# Patient Record
Sex: FEMALE | Race: WHITE | NOT HISPANIC OR LATINO | Employment: OTHER
[De-identification: names, ages, dates, MRNs, and addresses within clinical notes are randomized per-mention and may not be internally consistent; named-entity substitution may affect disease eponyms.]

---

## 2017-08-19 ENCOUNTER — HEALTH MAINTENANCE LETTER (OUTPATIENT)
Age: 52
End: 2017-08-19

## 2017-08-25 ENCOUNTER — TELEPHONE (OUTPATIENT)
Dept: OBGYN | Facility: CLINIC | Age: 52
End: 2017-08-25

## 2017-08-25 NOTE — LETTER
August 25, 2017      Britt Elizabethmichele  [address] [address] [employer] [facility] [address]    Dear ,      This letter is to remind you that you are due for your follow up PAP smear .    Please call [phone] to schedule your appointment at your earliest convenience.     If you have completed the tests outside of [place], please have the results forwarded to our office. We will update the chart for your primary Physician to review before your next annual physical.     Sincerely,      Rosa Powell MD

## 2017-08-25 NOTE — TELEPHONE ENCOUNTER
Panel Management Review          Composite cancer screening  Chart review shows that this patient is due/due soon for the following Pap Smear  Summary:    Patient is due/failing the following:   PAP    Action needed:   Patient needs office visit for pap.    Type of outreach:    Sent letter.    Questions for provider review:    None                                                                                                                                    Luanne Rosario

## 2017-12-06 ENCOUNTER — OFFICE VISIT (OUTPATIENT)
Dept: FAMILY MEDICINE | Facility: CLINIC | Age: 52
End: 2017-12-06
Payer: COMMERCIAL

## 2017-12-06 VITALS
BODY MASS INDEX: 30.22 KG/M2 | HEART RATE: 75 BPM | DIASTOLIC BLOOD PRESSURE: 110 MMHG | SYSTOLIC BLOOD PRESSURE: 185 MMHG | HEIGHT: 64 IN | WEIGHT: 177 LBS | TEMPERATURE: 96.5 F

## 2017-12-06 DIAGNOSIS — I10 SEVERE HYPERTENSION: Primary | ICD-10-CM

## 2017-12-06 DIAGNOSIS — I10 BENIGN ESSENTIAL HYPERTENSION: ICD-10-CM

## 2017-12-06 PROCEDURE — 99214 OFFICE O/P EST MOD 30 MIN: CPT | Performed by: FAMILY MEDICINE

## 2017-12-06 RX ORDER — AMLODIPINE BESYLATE 5 MG/1
5 TABLET ORAL DAILY
Qty: 90 TABLET | Refills: 3 | Status: SHIPPED | OUTPATIENT
Start: 2017-12-06 | End: 2017-12-14

## 2017-12-06 RX ORDER — METOPROLOL TARTRATE 25 MG/1
25 TABLET, FILM COATED ORAL 2 TIMES DAILY
Qty: 180 TABLET | Refills: 3 | Status: SHIPPED | OUTPATIENT
Start: 2017-12-06 | End: 2018-03-19

## 2017-12-06 RX ORDER — AMLODIPINE BESYLATE 2.5 MG/1
2.5 TABLET ORAL DAILY
Qty: 90 TABLET | Refills: 3 | Status: CANCELLED | OUTPATIENT
Start: 2017-12-06

## 2017-12-06 NOTE — NURSING NOTE
"Chief Complaint   Patient presents with     Hypertension     Refill Request       Initial BP (!) 190/115 (BP Location: Right arm, Cuff Size: Adult Regular)  Pulse 75  Temp 96.5  F (35.8  C) (Tympanic)  Ht 5' 4\" (1.626 m)  Wt 177 lb (80.3 kg)  BMI 30.38 kg/m2 Estimated body mass index is 30.38 kg/(m^2) as calculated from the following:    Height as of this encounter: 5' 4\" (1.626 m).    Weight as of this encounter: 177 lb (80.3 kg).  Medication Reconciliation: complete  "

## 2017-12-06 NOTE — PATIENT INSTRUCTIONS
Thank you for choosing [facility].  You may be receiving a survey in the mail from [facility] Romero regarding your visit today.  Please take a few minutes to complete and return the survey to let us know how we are doing.      If you have questions or concerns, please contact us via DOZ or you can contact your care team at [phone].    Our Clinic hours are:  Monday 6:40 am  to 7:00 pm  Tuesday -Friday 6:40 am to 5:00 pm    The [place] outpatient lab hours are:  Monday - Friday 6:10 am to 4:45 pm  Saturdays 7:00 am to 11:00 am  Appointments are required, call [phone]    If you have clinical questions after hours or would like to schedule an appointment,  call the clinic at [phone].  Uncontrolled High Blood Pressure (Established)    Your blood pressure was unusually high today. This can occur if you ve missed doses of your blood pressure medicine. Or it can happen if you are taking other medicines. These include some asthma inhalers, decongestants, diet pills, and street drugs like cocaine and amphetamine.  Other causes include:    Weight gain    More salt in your diet    Smoking    Caffeine  Your blood pressure can also rise if you are emotionally upset or in intense pain. It may go back to normal after a period of rest.  A blood pressure reading is made up of 2 numbers. There is a top number over a bottom number. The top number is the systolic pressure. The bottom number is the diastolic pressure. A normal blood pressure is a systolic pressure of less than 120 over a diastolic pressure of less than 80. High blood pressure (hypertension) is when the top number is 140 or higher. Or it is when the bottom number is 90 or higher. You will see your blood pressure readings written together. For example, a person with a systolic pressure of 118 and a diastolic pressure of 78 will have 118/78 written in the medical record. To be high blood pressure, the numbers must be higher when tested over a  period of time. The blood pressures between normal and hypertension are called prehypertension. Prehypertension is a warning sign. The information gives you a chance to make lifestyle changes (weight loss, more exercise) that can keep your blood pressure from going higher.  Home care  It s important to take steps to lower your blood pressure. If you are taking blood pressure medicine, the guidelines below may help you need less or no medicines in the future.    Begin a weight-loss program if you are overweight.    Cut back on the amount of salt in your diet:    Avoid high-salt foods like olives, pickles, smoked meats, and salted potato chips.    Don t add salt to your food at the table.    Use only small amounts of salt when cooking.    Begin an exercise program. Talk with your health care provider about what exercise program is best for you. It doesn t have to be difficult. Even brisk walking for 20 minutes 3 times a week is a good form of exercise.    Avoid medicines that stimulates the heart. This includes many over-the-counter cold and sinus decongestant pills and sprays, as well as diet pills. Check the warnings about hypertension on the label. Before purchasing any over-the-counter medicines or supplements, always ask the pharmacist about the product's potential interaction with your high blood pressure and your medicines.    Stimulants such as amphetamine or cocaine could be lethal for someone with hypertension. Never take these.    Limit how much caffeine you drink. Or switch to noncaffeinated beverages.    Stop smoking. If you are a long-time smoker, this can be hard. Enroll in a stop-smoking program to make it more likely that you will succeed. Talk with your provider about ways to quit.    Learn how to handle stress better. This is an important part of any program to lower blood pressure. Learn ways to relax. These include meditation, yoga, and biofeedback.    If medicines were prescribed, take them  exactly as directed. Missing doses may cause your blood pressure to get out of control.    If you miss a dose or doses of your medicines, check with your healthcare provider or pharmacist about what to do.    Consider buying an automatic blood pressure machine. Your provider may recommend a certain type. You can get one of these at most pharmacies. Measure your blood pressure twice a day, in the morning, and in the late afternoon. Keep a written record of your home blood pressure readings and take the record to your medical appointments.  Here are some additional guidelines on home blood pressure monitoring from the American Heart Association.    Don't smoke or drink coffee for 30 minutes    Go to the bathroom before the test.    Relax for 5 minutes before taking the measurement.    Sit correctly. Be sure your back is supported. Don't sit on a couch or soft chair. Uncross your feet and place them flat on the floor. Place your arm on a solid, flat surface like a table with the upper arm at heart level. Make certain the middle of the cuff is directly above the eye of the elbow. Check the monitor's instruction manual for an illustration.    Take multiple readings. When you measure, take 2 or 3 readings one minute apart and record all of the results.    Take your blood pressure at the same time every day, or as your healthcare provider recommends.    Record the date, time, and blood pressure reading.    Take the record with you to your next appointment. If your blood pressure monitor has a built-in memory, simply take the monitor with you to your next appointment.    Call your provider if you have several high readings. Don't be frightened by a single high reading, but if you get several high readings, check in with your healthcare provider.    Note: When blood pressure reaches a systolic (top number) of 180 or higher or a diastolic (bottom number) of 110 or higher, emergency medical treatment is required. Call your  healthcare provider immediately.  Follow-up care  Regular visits to your own healthcare provider for blood pressure and medicine checks are an important part of your care. Make a follow-up appointment as directed. Bring the record of your home blood pressure readings to the appointment.  When to seek medical advice  Call your healthcare provider right away if any of these occur:    Blood pressure reaches a systolic (top number) of 180 or higher or diastolic (bottom number) of 110 or higher, emergency medical treatment is required.    Chest, arm, shoulder, neck, or upper back pain    Shortness of breath    Severe headache    Throbbing or rushing sound in the ears    Nosebleed    Extreme drowsiness, confusion, or fainting    Dizziness or dizziness with spinning sensation (vertigo)    Weakness in an arm or leg or on one side of the face    Trouble speaking or seeing   Date Last Reviewed: 1/1/2017 2000-2017 [employer]. [address] [place] [id number]. All rights reserved. This information is not intended as a substitute for professional medical care. Always follow your healthcare professional's instructions.

## 2017-12-06 NOTE — PROGRESS NOTES
SUBJECTIVE:   Britt German is a 52 year old female who presents to clinic today for the following health issues:      Hypertension Follow-up      Outpatient blood pressures are being checked at home.  Results are 160/90s.witout medication     Low Salt Diet: low salt        Amount of exercise or physical activity: none at this time     Problems taking medications regularly: No    Medication side effects: none    Diet: low salt    Medication Followup of amlodipine     Taking Medication as prescribed: yes    Side Effects:  None    Medication Helping Symptoms:  yes           Patient with long standing hypertension, here for BP medication refills. She has been out of her medication for weeks. Has had some symptoms of severe headaches and palpitations.     Problem list and histories reviewed & adjusted, as indicated.  Additional history: as documented    Patient Active Problem List   Diagnosis     Family history of breast cancer in mother     Tubal ligation status     Hyperlipidemia with target LDL less than 160     Recurrent UTI     Hypertension goal BP (blood pressure) < 140/90     Indeterminate pulmonary nodules     LULÚ (obstructive sleep apnea)     Past Surgical History:   Procedure Laterality Date     ABDOMINOPLASTY      [facility]     ARTHROSCOPY ANKLE Left 2016    Procedure: ARTHROSCOPY ANKLE;  Surgeon: Zac Cuello DPM;  Location: [facility] OR     C/SECTION, LOW TRANSVERSE  ,     , Low Transverse     [facility] AFF SURGICAL PATHOLOGY  ,     Breast reduction     D & C       DILATION AND CURETTAGE, HYSTEROSCOPY, ABLATE ENDOMETRIUM NOVASURE, COMBINED  2014    Procedure: COMBINED DILATION AND CURETTAGE, HYSTEROSCOPY, ABLATE ENDOMETRIUM NOVASURE;  Surgeon: Rachel Hernandez DO;  Location: [facility] OR     INCISION AND DRAINAGE LOWER EXTREMITY, COMBINED  2011    COMBINED INCISION AND DRAINAGE LOWER EXTREMITY performed by LEY, JEFFREY DUANE at [facility] OR     OPEN REDUCTION INTERNAL  FIXATION ANKLE Left 12/23/2015    Procedure: OPEN REDUCTION INTERNAL FIXATION ANKLE;  Surgeon: Zac Cuello DPM;  Location: [facility] OR     REMOVE HARDWARE ANKLE  2/22/2011    REMOVE HARDWARE ANKLE performed by LEY, JEFFREY DUANE at [facility] OR     REMOVE HARDWARE ANKLE Left 9/1/2016    Procedure: REMOVE HARDWARE ANKLE;  Surgeon: Zac Cuello DPM;  Location: [facility] OR     SURGICAL HISTORY OF -   2006    Right ankle fracture, ORIF     TUBAL LIGATION  1994       Social History   Substance Use Topics     Smoking status: Never Smoker     Smokeless tobacco: Never Used     Alcohol use Yes      Comment: 2-3 times weekly     Family History   Problem Relation Age of Onset     Breast Cancer Mother      dx age 38     DIABETES Sister      Cardiovascular Father      CHF     Respiratory Father      COPD     Cardiovascular Son      aortic stenosis         Current Outpatient Prescriptions   Medication Sig Dispense Refill     amLODIPine (NORVASC) 5 MG tablet Take 1 tablet (5 mg) by mouth daily 90 tablet 3     metoprolol (LOPRESSOR) 25 MG tablet Take 1 tablet (25 mg) by mouth 2 times daily 180 tablet 3     amLODIPine (NORVASC) 2.5 MG tablet Take 1 tablet (2.5 mg) by mouth daily 90 tablet 1     Blood Pressure Monitoring (B-D ASSURE BPM/AUTO ARM CUFF) MISC 1 Device 3 times daily. 1 each 0     Allergies   Allergen Reactions     Hydrochlorothiazide Rash     Lisinopril      Hives     BP Readings from Last 3 Encounters:   12/06/17 (!) 185/110   10/06/16 150/90   02/04/16 138/85    Wt Readings from Last 3 Encounters:   12/06/17 177 lb (80.3 kg)   10/06/16 170 lb 3.2 oz (77.2 kg)   02/04/16 173 lb (78.5 kg)                  Labs reviewed in EPIC        Reviewed and updated as needed this visit by clinical staffTobacco  Allergies  Med Hx  Surg Hx  Fam Hx  Soc Hx      Reviewed and updated as needed this visit by Provider         ROS:  Constitutional, HEENT, cardiovascular, pulmonary, gi and gu systems are negative, except as otherwise  "noted.      OBJECTIVE:   BP (!) 185/110  Pulse 75  Temp 96.5  F (35.8  C) (Tympanic)  Ht 5' 4\" (1.626 m)  Wt 177 lb (80.3 kg)  BMI 30.38 kg/m2  Body mass index is 30.38 kg/(m^2).  GENERAL: healthy, alert and no distress  EYES: Eyes grossly normal to inspection, PERRL and conjunctivae and sclerae normal  HENT: ear canals and TM's normal, nose and mouth without ulcers or lesions  NECK: no adenopathy, no asymmetry, masses, or scars and thyroid normal to palpation  RESP: lungs clear to auscultation - no rales, rhonchi or wheezes  CV: regular rate and rhythm, normal S1 S2, no S3 or S4, no murmur, click or rub, no peripheral edema and peripheral pulses strong  MS: no gross musculoskeletal defects noted, no edema  SKIN: no suspicious lesions or rashes    Diagnostic Test Results:  Results for orders placed or performed in visit on 10/06/16   Follicle stimulating hormone   Result Value Ref Range    FSH 6.2 IU/L       ASSESSMENT/PLAN:   1. Benign essential hypertension  Largely uncontrolled. Added amlodipine to metoprolol. Patient reacted to hydrochlorothiazide when she used it.   - amLODIPine (NORVASC) 5 MG tablet; Take 1 tablet (5 mg) by mouth daily  Dispense: 90 tablet; Refill: 3  - metoprolol (LOPRESSOR) 25 MG tablet; Take 1 tablet (25 mg) by mouth 2 times daily  Dispense: 180 tablet; Refill: 3  - Lipid panel reflex to direct LDL Fasting; Future  - **Basic metabolic panel FUTURE anytime; Future    2. Severe hypertension  Needs to come back for a recheck in a couple of weeks.,  - amLODIPine (NORVASC) 5 MG tablet; Take 1 tablet (5 mg) by mouth daily  Dispense: 90 tablet; Refill: 3  - metoprolol (LOPRESSOR) 25 MG tablet; Take 1 tablet (25 mg) by mouth 2 times daily  Dispense: 180 tablet; Refill: 3  - Lipid panel reflex to direct LDL Fasting; Future    FUTURE APPOINTMENTS:       - Follow-up visit as needed    Tyesha Silva MD  [facility]  "

## 2017-12-06 NOTE — MR AVS SNAPSHOT
After Visit Summary   12/6/2017    Britt German    MRN: [id number]           Patient Information     Date Of Birth          1965        Visit Information        Provider Department      12/6/2017 7:20 AM Tyesha Silva MD [facility]        Today's Diagnoses     Benign essential hypertension          Care Instructions          Thank you for choosing [facility].  You may be receiving a survey in the mail from [facility] regarding your visit today.  Please take a few minutes to complete and return the survey to let us know how we are doing.      If you have questions or concerns, please contact us via [email or web address] or you can contact your care team at [phone].    Our Clinic hours are:  Monday 6:40 am  to 7:00 pm  Tuesday -Friday 6:40 am to 5:00 pm    The [place] outpatient lab hours are:  Monday - Friday 6:10 am to 4:45 pm  Saturdays 7:00 am to 11:00 am  Appointments are required, call [phone]    If you have clinical questions after hours or would like to schedule an appointment,  call the clinic at [phone].  Uncontrolled High Blood Pressure (Established)    Your blood pressure was unusually high today. This can occur if you ve missed doses of your blood pressure medicine. Or it can happen if you are taking other medicines. These include some asthma inhalers, decongestants, diet pills, and street drugs like cocaine and amphetamine.  Other causes include:    Weight gain    More salt in your diet    Smoking    Caffeine  Your blood pressure can also rise if you are emotionally upset or in intense pain. It may go back to normal after a period of rest.  A blood pressure reading is made up of 2 numbers. There is a top number over a bottom number. The top number is the systolic pressure. The bottom number is the diastolic pressure. A normal blood pressure is a systolic pressure of less than 120 over a diastolic pressure of less than 80. High blood pressure  (hypertension) is when the top number is 140 or higher. Or it is when the bottom number is 90 or higher. You will see your blood pressure readings written together. For example, a person with a systolic pressure of 118 and a diastolic pressure of 78 will have 118/78 written in the medical record. To be high blood pressure, the numbers must be higher when tested over a period of time. The blood pressures between normal and hypertension are called prehypertension. Prehypertension is a warning sign. The information gives you a chance to make lifestyle changes (weight loss, more exercise) that can keep your blood pressure from going higher.  Home care  It s important to take steps to lower your blood pressure. If you are taking blood pressure medicine, the guidelines below may help you need less or no medicines in the future.    Begin a weight-loss program if you are overweight.    Cut back on the amount of salt in your diet:    Avoid high-salt foods like olives, pickles, smoked meats, and salted potato chips.    Don t add salt to your food at the table.    Use only small amounts of salt when cooking.    Begin an exercise program. Talk with your health care provider about what exercise program is best for you. It doesn t have to be difficult. Even brisk walking for 20 minutes 3 times a week is a good form of exercise.    Avoid medicines that stimulates the heart. This includes many over-the-counter cold and sinus decongestant pills and sprays, as well as diet pills. Check the warnings about hypertension on the label. Before purchasing any over-the-counter medicines or supplements, always ask the pharmacist about the product's potential interaction with your high blood pressure and your medicines.    Stimulants such as amphetamine or cocaine could be lethal for someone with hypertension. Never take these.    Limit how much caffeine you drink. Or switch to noncaffeinated beverages.    Stop smoking. If you are a long-time  smoker, this can be hard. Enroll in a stop-smoking program to make it more likely that you will succeed. Talk with your provider about ways to quit.    Learn how to handle stress better. This is an important part of any program to lower blood pressure. Learn ways to relax. These include meditation, yoga, and biofeedback.    If medicines were prescribed, take them exactly as directed. Missing doses may cause your blood pressure to get out of control.    If you miss a dose or doses of your medicines, check with your healthcare provider or pharmacist about what to do.    Consider buying an automatic blood pressure machine. Your provider may recommend a certain type. You can get one of these at most pharmacies. Measure your blood pressure twice a day, in the morning, and in the late afternoon. Keep a written record of your home blood pressure readings and take the record to your medical appointments.  Here are some additional guidelines on home blood pressure monitoring from the American Heart Association.    Don't smoke or drink coffee for 30 minutes    Go to the bathroom before the test.    Relax for 5 minutes before taking the measurement.    Sit correctly. Be sure your back is supported. Don't sit on a couch or soft chair. Uncross your feet and place them flat on the floor. Place your arm on a solid, flat surface like a table with the upper arm at heart level. Make certain the middle of the cuff is directly above the eye of the elbow. Check the monitor's instruction manual for an illustration.    Take multiple readings. When you measure, take 2 or 3 readings one minute apart and record all of the results.    Take your blood pressure at the same time every day, or as your healthcare provider recommends.    Record the date, time, and blood pressure reading.    Take the record with you to your next appointment. If your blood pressure monitor has a built-in memory, simply take the monitor with you to your next  appointment.    Call your provider if you have several high readings. Don't be frightened by a single high reading, but if you get several high readings, check in with your healthcare provider.    Note: When blood pressure reaches a systolic (top number) of 180 or higher or a diastolic (bottom number) of 110 or higher, emergency medical treatment is required. Call your healthcare provider immediately.  Follow-up care  Regular visits to your own healthcare provider for blood pressure and medicine checks are an important part of your care. Make a follow-up appointment as directed. Bring the record of your home blood pressure readings to the appointment.  When to seek medical advice  Call your healthcare provider right away if any of these occur:    Blood pressure reaches a systolic (top number) of 180 or higher or diastolic (bottom number) of 110 or higher, emergency medical treatment is required.    Chest, arm, shoulder, neck, or upper back pain    Shortness of breath    Severe headache    Throbbing or rushing sound in the ears    Nosebleed    Extreme drowsiness, confusion, or fainting    Dizziness or dizziness with spinning sensation (vertigo)    Weakness in an arm or leg or on one side of the face    Trouble speaking or seeing   Date Last Reviewed: 1/1/2017 2000-2017 [employer]. [address] [place] [address]. All rights reserved. This information is not intended as a substitute for professional medical care. Always follow your healthcare professional's instructions.                Follow-ups after your visit        Your next 10 appointments already scheduled     Dec 14, 2017  9:15 AM CST   PHYS[facility] with Millicent Corona MD   [facility]    [address] [facility] [address]   [phone]            Dec 29, 2017  7:45 AM CST   (Arrive by 7:30 AM)   MA SCREENING DIGITAL BILATERAL with [address] ([place]  "[facility])    [address] [facility] [address] [phone]           Do not use any powder, lotion or deodorant under your arms or on your breast. If you do, we will ask you to remove it before your exam.  Wear comfortable, two-piece clothing.  If you have any allergies, tell your care team.  Bring any previous mammograms from other facilities or have them mailed to the breast center. Three-dimensional (3D) mammograms are available at [place] locations in [facility], [facility], [facility], and [place]. [facility] locations include [place] and [facility] in [place]. Benefits of 3D mammograms include: - Improved rate of cancer detection - Decreases your chance of having to go back for more tests, which means fewer: - \"False-positive\" results (This means that there is an abnormal area but it isn't cancer.) - Invasive testing procedures, such as a biopsy or surgery - Can provide clearer images of the breast if you have dense breast tissue. 3D mammography is an optional exam that anyone can have with a 2D mammogram. It doesn't replace or take the place of a 2D mammogram. 2D mammograms remain an effective screening test for all women.  Not all insurance companies cover the cost of a 3D mammogram. Check with your insurance.              Future tests that were ordered for you today     Open Future Orders        Priority Expected Expires Ordered    Lipid panel reflex to direct LDL Fasting Routine 12/6/2017 12/6/2018 12/6/2017    **Basic metabolic panel FUTURE anytime Routine 12/6/2017 12/6/2018 12/6/2017    MA Screening Digital Bilateral Routine  12/5/2018 12/5/2017            Who to contact     If you have questions or need follow up information about today's clinic visit or your schedule please contact [facility] directly at [phone].  Normal or non-critical lab and imaging results will be communicated to you by [facility], " "letter or phone within 4 business days after the clinic has received the results. If you do not hear from us within 7 days, please contact the clinic through [employer] or phone. If you have a critical or abnormal lab result, we will notify you by phone as soon as possible.  Submit refill requests through [employer] or call your pharmacy and they will forward the refill request to us. Please allow 3 business days for your refill to be completed.          Additional Information About Your Visit        [employer] Information     [employer] gives you secure access to your electronic health record. If you see a primary care provider, you can also send messages to your care team and make appointments. If you have questions, please call your primary care clinic.  If you do not have a primary care provider, please call [phone] and they will assist you.        Care EveryWhere ID     This is your Care EveryWhere ID. This could be used by other organizations to access your [place] medical records  [id number]        Your Vitals Were     Pulse Temperature Height BMI (Body Mass Index)          75 96.5  F (35.8  C) (Tympanic) 5' 4\" (1.626 m) 30.38 kg/m2         Blood Pressure from Last 3 Encounters:   12/06/17 (!) 190/115   10/06/16 150/90   02/04/16 138/85    Weight from Last 3 Encounters:   12/06/17 177 lb (80.3 kg)   10/06/16 170 lb 3.2 oz (77.2 kg)   02/04/16 173 lb (78.5 kg)                 Today's Medication Changes          These changes are accurate as of: 12/6/17  8:15 AM.  If you have any questions, ask your nurse or doctor.               Start taking these medicines.        Dose/Directions    metoprolol 25 MG tablet   Commonly known as:  LOPRESSOR   Used for:  Benign essential hypertension   Started by:  Tyesha Silva MD        Dose:  25 mg   Take 1 tablet (25 mg) by mouth 2 times daily   Quantity:  180 tablet   Refills:  3         These medicines have changed or have updated prescriptions.        " Dose/Directions    * amLODIPine 2.5 MG tablet   Commonly known as:  NORVASC   This may have changed:  Another medication with the same name was added. Make sure you understand how and when to take each.   Used for:  Benign essential hypertension   Changed by:  Tyesha Silva MD        Dose:  2.5 mg   Take 1 tablet (2.5 mg) by mouth daily   Quantity:  90 tablet   Refills:  1       * amLODIPine 5 MG tablet   Commonly known as:  NORVASC   This may have changed:  You were already taking a medication with the same name, and this prescription was added. Make sure you understand how and when to take each.   Used for:  Benign essential hypertension   Changed by:  Tyesha Silva MD        Dose:  5 mg   Take 1 tablet (5 mg) by mouth daily   Quantity:  90 tablet   Refills:  3       * Notice:  This list has 2 medication(s) that are the same as other medications prescribed for you. Read the directions carefully, and ask your doctor or other care provider to review them with you.         Where to get your medicines      These medications were sent to [facility] [address]  [address] [facility] [address]     Phone:  [phone]     amLODIPine 5 MG tablet    metoprolol 25 MG tablet                Primary Care Provider Office Phone # Fax #    Shawna Preston [phone]       [facility] CARE [facility] [address] [place] [address]        Equal Access to Services     BRIAN CASTILLO AH: Hadii arian solares hadasho Sobreanna, waaxda luqadaha, qaybta kaalmada adeegyada, zion rockwell hayshira arenas . So [facility] [phone].    ATENCIÓN: Si habla español, tiene a trimble disposición servicios gratuitos de asistencia lingüística. Llame al [phone].    We comply with applicable federal civil rights laws and [place] laws. We do not discriminate on the basis of race, color, national origin, age, disability, sex, sexual orientation, or gender  identity.            Thank you!     Thank you for choosing [facility]  for your care. Our goal is always to provide you with excellent care. Hearing back from our patients is one way we can continue to improve our services. Please take a few minutes to complete the written survey that you may receive in the mail after your visit with us. Thank you!             Your Updated Medication List - Protect others around you: Learn how to safely use, store and throw away your medicines at www.disposemymeds.org.          This list is accurate as of: 12/6/17  8:15 AM.  Always use your most recent med list.                   Brand Name Dispense Instructions for use Diagnosis    * amLODIPine 2.5 MG tablet    NORVASC    90 tablet    Take 1 tablet (2.5 mg) by mouth daily    Benign essential hypertension       * amLODIPine 5 MG tablet    NORVASC    90 tablet    Take 1 tablet (5 mg) by mouth daily    Benign essential hypertension       B-D ASSURE BPM/AUTO ARM CUFF Misc     1 each    1 Device 3 times daily.    Elevated blood pressure reading without diagnosis of hypertension       metoprolol 25 MG tablet    LOPRESSOR    180 tablet    Take 1 tablet (25 mg) by mouth 2 times daily    Benign essential hypertension       * Notice:  This list has 2 medication(s) that are the same as other medications prescribed for you. Read the directions carefully, and ask your doctor or other care provider to review them with you.

## 2017-12-14 ENCOUNTER — OFFICE VISIT (OUTPATIENT)
Dept: OBGYN | Facility: CLINIC | Age: 52
End: 2017-12-14
Payer: COMMERCIAL

## 2017-12-14 VITALS
WEIGHT: 183 LBS | SYSTOLIC BLOOD PRESSURE: 131 MMHG | BODY MASS INDEX: 31.24 KG/M2 | DIASTOLIC BLOOD PRESSURE: 84 MMHG | HEIGHT: 64 IN | HEART RATE: 76 BPM

## 2017-12-14 DIAGNOSIS — Z01.419 WELL FEMALE EXAM WITH ROUTINE GYNECOLOGICAL EXAM: Primary | ICD-10-CM

## 2017-12-14 PROCEDURE — G0145 SCR C/V CYTO,THINLAYER,RESCR: HCPCS | Performed by: OBSTETRICS & GYNECOLOGY

## 2017-12-14 PROCEDURE — 99396 PREV VISIT EST AGE 40-64: CPT | Performed by: OBSTETRICS & GYNECOLOGY

## 2017-12-14 PROCEDURE — 87624 HPV HI-RISK TYP POOLED RSLT: CPT | Performed by: OBSTETRICS & GYNECOLOGY

## 2017-12-14 NOTE — NURSING NOTE
"Initial /84 (BP Location: Left arm, Patient Position: Chair, Cuff Size: Adult Regular)  Pulse 76  Ht 5' 4\" (1.626 m)  Wt 183 lb (83 kg)  Breastfeeding? No  BMI 31.41 kg/m2 Estimated body mass index is 31.41 kg/(m^2) as calculated from the following:    Height as of this encounter: 5' 4\" (1.626 m).    Weight as of this encounter: 183 lb (83 kg). .    Luanne Rosario      "

## 2017-12-14 NOTE — PROGRESS NOTES
SUBJECTIVE:   CC: Britt German is an 52 year old P2 (C-sec x 2)woman who presents for preventive health visit.     Healthy Habits:    Do you get at least three servings of calcium containing foods daily (dairy, green leafy vegetables, etc.)? yes    Amount of exercise or daily activities, outside of work: 2 day(s) per week    Problems taking medications regularly No    Medication side effects: No    Have you had an eye exam in the past two years? yes    Do you see a dentist twice per year? yes    Do you have sleep apnea, excessive snoring or daytime drowsiness?no      No concerns at this time.   Denies vaginal bleeding since endometrial ablation in 2014.    Today's PHQ-2 Score: PHQ-2 ( 1999 Pfizer) 12/14/2017 10/6/2016   Q1: Little interest or pleasure in doing things 0 0   Q2: Feeling down, depressed or hopeless 0 0   PHQ-2 Score 0 0         Abuse: Current or Past(Physical, Sexual or Emotional)- No  Do you feel safe in your environment - Yes  Social History   Substance Use Topics     Smoking status: Never Smoker     Smokeless tobacco: Never Used     Alcohol use Yes      Comment: 2-3 times weekly     If you drink alcohol do you typically have >3 drinks per day or >7 drinks per week? No                     Reviewed orders with patient.  Reviewed health maintenance and updated orders accordingly - Yes  Current Outpatient Prescriptions   Medication Sig Dispense Refill     metoprolol (LOPRESSOR) 25 MG tablet Take 1 tablet (25 mg) by mouth 2 times daily 180 tablet 3     amLODIPine (NORVASC) 2.5 MG tablet Take 1 tablet (2.5 mg) by mouth daily 90 tablet 1     Blood Pressure Monitoring (B-D ASSURE BPM/AUTO ARM CUFF) MISC 1 Device 3 times daily. 1 each 0     [DISCONTINUED] amLODIPine (NORVASC) 5 MG tablet Take 1 tablet (5 mg) by mouth daily 90 tablet 3     Allergies   Allergen Reactions     Hydrochlorothiazide Rash     Lisinopril      Hives       Patient over age 50, mutual decision to screen reflected in health  maintenance.      Pertinent mammograms are reviewed under the imaging tab.  History of abnormal Pap smear: NO - age 30-65 PAP every 5 years with negative HPV co-testing recommended    Reviewed and updated as needed this visit by clinical staffTobacco  Allergies  Meds  Med Hx  Surg Hx  Fam Hx  Soc Hx        Reviewed and updated as needed this visit by Provider        Past Medical History:   Diagnosis Date     Recurrent UTI       Past Surgical History:   Procedure Laterality Date     ABDOMINOPLASTY      [facility]     ARTHROSCOPY ANKLE Left 2016    Procedure: ARTHROSCOPY ANKLE;  Surgeon: Zac Cuello DPM;  Location: [facility] OR     C/SECTION, LOW TRANSVERSE  ,     , Low Transverse     [facility]FF SURGICAL PATHOLOGY  ,     Breast reduction     D & C       DILATION AND CURETTAGE, HYSTEROSCOPY, ABLATE ENDOMETRIUM NOVASURE, COMBINED  2014    Procedure: COMBINED DILATION AND CURETTAGE, HYSTEROSCOPY, ABLATE ENDOMETRIUM NOVASURE;  Surgeon: Rachel Hernandez DO;  Location: [facility]     INCISION AND DRAINAGE LOWER EXTREMITY, COMBINED  2011    COMBINED INCISION AND DRAINAGE LOWER EXTREMITY performed by LEY, JEFFREY DUANE at [facility]     OPEN REDUCTION INTERNAL FIXATION ANKLE Left 2015    Procedure: OPEN REDUCTION INTERNAL FIXATION ANKLE;  Surgeon: Zac Cuello DPM;  Location: [facility] OR     REMOVE HARDWARE ANKLE  2011    REMOVE HARDWARE ANKLE performed by LEY, JEFFREY DUANE at [facility]     REMOVE HARDWARE ANKLE Left 2016    Procedure: REMOVE HARDWARE ANKLE;  Surgeon: Zac Cuello DPM;  Location: [facility] OR     SURGICAL HISTORY OF -       Right ankle fracture, ORIF     TUBAL LIGATION       Obstetric History       T2      L2     SAB2   TAB0   Ectopic0   Multiple0   Live Births2       # Outcome Date GA Lbr Vicente/2nd Weight Sex Delivery Anes PTL Lv   4 SAB            3 SAB            2 Term      CS-Unspec   MITCHELL   1 Term      CS-Unspec   MITCHELL     "  Obstetric Comments    x 2       ROS:  C: NEGATIVE for fever, chills, change in weight  I: NEGATIVE for worrisome rashes, moles or lesions  E: NEGATIVE for vision changes or irritation  ENT: NEGATIVE for ear, mouth and throat problems  R: NEGATIVE for significant cough or SOB  B: NEGATIVE for masses, tenderness or discharge  CV: NEGATIVE for chest pain, palpitations or peripheral edema  GI: NEGATIVE for nausea, abdominal pain, heartburn, or change in bowel habits  : NEGATIVE for unusual urinary or vaginal symptoms. No vaginal bleeding.  M: NEGATIVE for significant arthralgias or myalgia  N: NEGATIVE for weakness, dizziness or paresthesias  P: NEGATIVE for changes in mood or affect     OBJECTIVE:   /84 (BP Location: Left arm, Patient Position: Chair, Cuff Size: Adult Regular)  Pulse 76  Ht 5' 4\" (1.626 m)  Wt 183 lb (83 kg)  Breastfeeding? No  BMI 31.41 kg/m2  EXAM:  GENERAL: healthy, alert and no distress  HENT: atraumatic, normocephalic  NECK: no adenopathy, no asymmetry, masses, or scars and thyroid normal to palpation  RESP: lungs clear to auscultation - no rales, rhonchi or wheezes  BREAST: normal without masses, tenderness or nipple discharge and no palpable axillary masses or adenopathy  CV: regular rate and rhythm, normal S1 S2, no S3 or S4, no murmur, click or rub, no peripheral edema and peripheral pulses strong  ABDOMEN: soft, nontender, no hepatosplenomegaly, no masses and bowel sounds normal  PELVIC: Normal external female genitalia.  No external lesions, normal hair distribution, no adenopathy. Speculum exam reveals vaginal epithelium well rugated with no abnormal discharge. Cervix appears smooth, pink, with no visible lesions. Bimanual exam reveals normal size uterus, anteverted, non-tender, and mobile. No adnexal masses or tenderness. No cervical motion tenderness.   MS: no gross musculoskeletal defects noted, no edema  SKIN: no suspicious lesions or rashes  NEURO: Normal " "strength and tone, mentation intact and speech normal  PSYCH: mentation appears normal, affect normal/bright    ASSESSMENT/PLAN:   1. Well female exam with routine gynecological exam  - Pap imaged thin layer screen with HPV - recommended age 30 - 65 years (select HPV order below)    COUNSELING:   Reviewed preventive health counseling, as reflected in patient instructions       Regular exercise       Healthy diet/nutrition         reports that she has never smoked. She has never used smokeless tobacco.    Estimated body mass index is 31.41 kg/(m^2) as calculated from the following:    Height as of this encounter: 5' 4\" (1.626 m).    Weight as of this encounter: 183 lb (83 kg).     Counseling Resources:  ATP IV Guidelines  Pooled Cohorts Equation Calculator  Breast Cancer Risk Calculator  FRAX Risk Assessment  ICSI Preventive Guidelines  Dietary Guidelines for Americans, 2010  USDA's MyPlate  ASA Prophylaxis  Lung CA Screening    Millicent Corona MD  [facility]  "

## 2017-12-14 NOTE — MR AVS SNAPSHOT
After Visit Summary   12/14/2017    Britt German    MRN: [id number]           Patient Information     Date Of Birth          1965        Visit Information        Provider Department      12/14/2017 9:15 AM Millicent Corona MD [facility]        Today's Diagnoses     Well female exam with routine gynecological exam    -  1      Care Instructions      Preventive Health Recommendations  Female Ages 50 - 64    Yearly exam: See your health care provider every year in order to  o Review health changes.   o Discuss preventive care.    o Review your medicines if your doctor has prescribed any.      Get a Pap test every three years (unless you have an abnormal result and your provider advises testing more often).    If you get Pap tests with HPV test, you only need to test every 5 years, unless you have an abnormal result.     You do not need a Pap test if your uterus was removed (hysterectomy) and you have not had cancer.    You should be tested each year for STDs (sexually transmitted diseases) if you're at risk.     Have a mammogram every 1 to 2 years.    Have a colonoscopy at age 50, or have a yearly FIT test (stool test). These exams screen for colon cancer.      Have a cholesterol test every 5 years, or more often if advised.    Have a diabetes test (fasting glucose) every three years. If you are at risk for diabetes, you should have this test more often.     If you are at risk for osteoporosis (brittle bone disease), think about having a bone density scan (DEXA).    Shots: Get a flu shot each year. Get a tetanus shot every 10 years.    Nutrition:     Eat at least 5 servings of fruits and vegetables each day.    Eat whole-grain bread, whole-wheat pasta and brown rice instead of white grains and rice.    Talk to your provider about Calcium and Vitamin D.     Lifestyle    Exercise at least 150 minutes a week (30 minutes a day, 5 days a week). This will help you control your  "weight and prevent disease.    Limit alcohol to one drink per day.    No smoking.     Wear sunscreen to prevent skin cancer.     See your dentist every six months for an exam and cleaning.    See your eye doctor every 1 to 2 years.            Follow-ups after your visit        Your next 10 appointments already scheduled     Dec 21, 2017 11:00 AM CST   New Sleep Patient with Niraj Lopez MD   [facility] ([facility])    [address]  [facility] [address]   [phone]            Dec 29, 2017  7:45 AM CST   (Arrive by 7:30 AM)   MA SCREENING DIGITAL BILATERAL with [facility]   [facility] ([facility])    [address] [facility] [address]   [phone]           Do not use any powder, lotion or deodorant under your arms or on your breast. If you do, we will ask you to remove it before your exam.  Wear comfortable, two-piece clothing.  If you have any allergies, tell your care team.  Bring any previous mammograms from other facilities or have them mailed to the breast center. Three-dimensional (3D) mammograms are available at [facility] locations in [facility], [place], [place], [facility], [place], [place], and [place]. [facility] locations include [place] and [facility] in [place]. Benefits of 3D mammograms include: - Improved rate of cancer detection - Decreases your chance of having to go back for more tests, which means fewer: - \"False-positive\" results (This means that there is an abnormal area but it isn't cancer.) - Invasive testing procedures, such as a biopsy or surgery - Can provide clearer images of the breast if you have dense breast tissue. 3D mammography is an optional exam that anyone can have with a 2D mammogram. It doesn't replace or take the place of a 2D mammogram. 2D mammograms remain an effective screening test for all women.  Not all insurance companies cover the cost of " "a 3D mammogram. Check with your insurance.              Who to contact     If you have questions or need follow up information about today's clinic visit or your schedule please contact [facility] directly at [phone].  Normal or non-critical lab and imaging results will be communicated to you by MyChart, letter or phone within 4 business days after the clinic has received the results. If you do not hear from us within 7 days, please contact the clinic through eTechart or phone. If you have a critical or abnormal lab result, we will notify you by phone as soon as possible.  Submit refill requests through [employer] or call your pharmacy and they will forward the refill request to us. Please allow 3 business days for your refill to be completed.          Additional Information About Your Visit        eTechart Information     [employer] gives you secure access to your electronic health record. If you see a primary care provider, you can also send messages to your care team and make appointments. If you have questions, please call your primary care clinic.  If you do not have a primary care provider, please call [phone] and they will assist you.        Care EveryWhere ID     This is your Care EveryWhere ID. This could be used by other organizations to access your [facility] medical records  [id number]        Your Vitals Were     Pulse Height Breastfeeding? BMI (Body Mass Index)          76 5' 4\" (1.626 m) No 31.41 kg/m2         Blood Pressure from Last 3 Encounters:   12/14/17 131/84   12/06/17 (!) 185/110   10/06/16 150/90    Weight from Last 3 Encounters:   12/14/17 183 lb (83 kg)   12/06/17 177 lb (80.3 kg)   10/06/16 170 lb 3.2 oz (77.2 kg)              We Performed the Following     Pap imaged thin layer screen with HPV - recommended age 30 - 65 years (select HPV order below)        Primary Care Provider Office Phone # Fax #    Shawna Preston [phone]       [facility] " [address] [place] [address]        Equal Access to Services     GABISEE JONATHAN : Hadii aad ku hadmemosean Sobreanna, wacecilda lulaurensanchezha, qaabhita kadebteresa ballsolitarioteresa, zion rockwell geovanianirudh garciadeontefaye villarreal. So [facility] [phone].    ATENCIÓN: Si habla español, tiene a trimble disposición servicios gratuitos de asistencia lingüística. Llame al [phone].    We comply with applicable federal civil rights laws and [place] laws. We do not discriminate on the basis of race, color, national origin, age, disability, sex, sexual orientation, or gender identity.            Thank you!     Thank you for choosing [facility]  for your care. Our goal is always to provide you with excellent care. Hearing back from our patients is one way we can continue to improve our services. Please take a few minutes to complete the written survey that you may receive in the mail after your visit with us. Thank you!             Your Updated Medication List - Protect others around you: Learn how to safely use, store and throw away your medicines at www.disposemymeds.org.          This list is accurate as of: 12/14/17 10:01 AM.  Always use your most recent med list.                   Brand Name Dispense Instructions for use Diagnosis    amLODIPine 2.5 MG tablet    NORVASC    90 tablet    Take 1 tablet (2.5 mg) by mouth daily    Benign essential hypertension       B-D ASSURE BPM/AUTO ARM CUFF Misc     1 each    1 Device 3 times daily.    Elevated blood pressure reading without diagnosis of hypertension       metoprolol 25 MG tablet    LOPRESSOR    180 tablet    Take 1 tablet (25 mg) by mouth 2 times daily    Benign essential hypertension, Severe hypertension

## 2017-12-18 LAB
COPATH REPORT: NORMAL
PAP: NORMAL

## 2017-12-20 LAB
FINAL DIAGNOSIS: NORMAL
HPV HR 12 DNA CVX QL NAA+PROBE: NEGATIVE
HPV16 DNA SPEC QL NAA+PROBE: NEGATIVE
HPV18 DNA SPEC QL NAA+PROBE: NEGATIVE
SPECIMEN DESCRIPTION: NORMAL

## 2017-12-21 ENCOUNTER — OFFICE VISIT (OUTPATIENT)
Dept: SLEEP MEDICINE | Facility: CLINIC | Age: 52
End: 2017-12-21
Payer: COMMERCIAL

## 2017-12-21 VITALS
DIASTOLIC BLOOD PRESSURE: 90 MMHG | HEART RATE: 90 BPM | SYSTOLIC BLOOD PRESSURE: 148 MMHG | WEIGHT: 183 LBS | HEIGHT: 64 IN | OXYGEN SATURATION: 94 % | BODY MASS INDEX: 31.24 KG/M2

## 2017-12-21 DIAGNOSIS — G47.33 OSA (OBSTRUCTIVE SLEEP APNEA): Primary | ICD-10-CM

## 2017-12-21 PROCEDURE — 99205 OFFICE O/P NEW HI 60 MIN: CPT | Performed by: FAMILY MEDICINE

## 2017-12-21 NOTE — NURSING NOTE
"No chief complaint on file.      Initial BP (!) 154/91  Pulse 90  Ht 1.626 m (5' 4.02\")  Wt 83 kg (183 lb)  SpO2 94%  BMI 31.4 kg/m2 Estimated body mass index is 31.4 kg/(m^2) as calculated from the following:    Height as of this encounter: 1.626 m (5' 4.02\").    Weight as of this encounter: 83 kg (183 lb).  Medication Reconciliation: complete  "

## 2017-12-21 NOTE — PROGRESS NOTES
Sleep Consultation:    Date on this visit: 12/21/2017    Britt German is sent by No ref. provider found for a sleep consultation regarding alternate LULÚ treatment options.    Primary Physician: Shawna Preston     Chief Complaint: Re-establishing care for LULÚ and to discuss alternate treatment options    HPI: Britt German is a 53 yo female w/ PHM of severe LULÚ per SS 5/13 and HTN requiring w/ dual therapy who presented to clinic to re-establish care for her LULÚ. At the time of the initial study, patient's AHI was 43 and O2 sats angel of 85% and she was set-up w/ an auto-titrate mode of 10-15 cmH2O. However the patient has not been using her CPAP for years now, for the exception of the last week due to worsening BP. Her max BP was 190/115 on 12/06 and gradually increasing starting in early 2016. At the time of the visit, patient's BP improved to 148/90, however she reports not being able to tolerate her CPAP and seeing no long-term potential in this treatment modality. Otherwise patient scored a total of 2 on the sleepiness scale, reports getting 5-7 hrs of sleep per night w/ 3-4 awakening, but has no trouble with sleep initiation. Mrs. Elizabeth also endorsed witnessed apnic episodes and snoring 6 of 7 days of the week when not using CPAP per her spouse and other family members.    Compliance summary report, for the past week only, demonstrated 85.7% device usage for a mean on 4 hrs 59 minutes and AHI of 0.2 on a 10-15 cmH2O auto-titrate setting. However despite the efficacy of her CPAP on paper, Mrs. German reports experiencing a wheezing sensation, feeling excessive pressure from the device, and expressed her concern for an infectious process, especially given PMH of lung nodule of unidentified etiology. Patient was reassured that given her current clinical picture infectious etiology would be an unlikely contributing factor to her pulmonary symptoms. Patient's most recent PFTs also failed to show either  obstructive or restrictive processes. When lowering the titration pressure range and/or fitting for a new mask were discussed, patient expressed her preference for a surgical intervention in the form of a hypoglossal nerve stimulation vs traditional approach. Procedures were discussed briefly as a result.     ROS was performed and remarkable for weight gain, changes in vision, SOB at rest, and swollen lymph nodes.      Allergies:    Allergies   Allergen Reactions     Hydrochlorothiazide Rash     Lisinopril      Hives       Medications:    Current Outpatient Prescriptions   Medication Sig Dispense Refill     metoprolol (LOPRESSOR) 25 MG tablet Take 1 tablet (25 mg) by mouth 2 times daily 180 tablet 3     amLODIPine (NORVASC) 2.5 MG tablet Take 1 tablet (2.5 mg) by mouth daily 90 tablet 1     Blood Pressure Monitoring (B-D ASSURE BPM/AUTO ARM CUFF) MISC 1 Device 3 times daily. 1 each 0       Problem List:  Patient Active Problem List    Diagnosis Date Noted     Hypertension goal BP (blood pressure) < 140/90 03/12/2013     Priority: High     LULÚ (obstructive sleep apnea) 05/23/2013     Priority: Medium     Severe LULÚ (5/21/2013 with AHI 43, angel desat 85%, CPAP 10 effective and included supine REM)       Indeterminate pulmonary nodules 04/09/2013     Priority: Medium     CT angio chest showing  (4/9/13):   Scattered small indeterminate pulmonary nodules in both lower lungs, with the largest measuring 0.5 cm on the left. Followup CT in 6-12 months is recommended ( 10/9/2013- 4/9/2013) .             Hyperlipidemia with target LDL less than 160 10/31/2010     Priority: Medium     Diagnosis updated by automated process. Provider to review and confirm.       Family history of breast cancer in mother 05/26/2009     Priority: Medium     Yearly MMG       Recurrent UTI      Priority: Low     Tubal ligation status 05/26/2009     Priority: Low        Past Medical/Surgical History:  Past Medical History:   Diagnosis Date      Recurrent UTI      Past Surgical History:   Procedure Laterality Date     ABDOMINOPLASTY      [facility]     ARTHROSCOPY ANKLE Left 2016    Procedure: ARTHROSCOPY ANKLE;  Surgeon: Zac Cuello DPM;  Location: [facility]     C/SECTION, LOW TRANSVERSE  ,     , Low Transverse     CL AFF SURGICAL PATHOLOGY  ,     Breast reduction     D & C       DILATION AND CURETTAGE, HYSTEROSCOPY, ABLATE ENDOMETRIUM NOVASURE, COMBINED  2014    Procedure: COMBINED DILATION AND CURETTAGE, HYSTEROSCOPY, ABLATE ENDOMETRIUM NOVASURE;  Surgeon: Rachel Hernandez DO;  Location: [facility]     INCISION AND DRAINAGE LOWER EXTREMITY, COMBINED  2011    COMBINED INCISION AND DRAINAGE LOWER EXTREMITY performed by LEY, JEFFREY DUANE at [facility]     OPEN REDUCTION INTERNAL FIXATION ANKLE Left 2015    Procedure: OPEN REDUCTION INTERNAL FIXATION ANKLE;  Surgeon: Zac Cuello DPM;  Location: [facility]     REMOVE HARDWARE ANKLE  2011    REMOVE HARDWARE ANKLE performed by LEY, JEFFREY DUANE at [facility]     REMOVE HARDWARE ANKLE Left 2016    Procedure: REMOVE HARDWARE ANKLE;  Surgeon: Zac Cuello DPM;  Location: [facility]     SURGICAL HISTORY OF -       Right ankle fracture, ORIF     TUBAL LIGATION         Social History:  Social History     Social History     Marital status:      Spouse name: N/A     Number of children: 2     Years of education: N/A     Occupational History     [employer]     Social History Main Topics     Smoking status: Never Smoker     Smokeless tobacco: Never Used     Alcohol use Yes      Comment: 2-3 times weekly     Drug use: No     Sexual activity: Yes     Partners: Male     Birth control/ protection: Surgical      Comment: tubal ligation      Other Topics Concern     Parent/Sibling W/ Cabg, Mi Or Angioplasty Before 65f 55m? No     Social History Narrative       Family History:  Family History   Problem Relation Age of  "Onset     Breast Cancer Mother      dx age 38     DIABETES Sister      Cardiovascular Father      CHF     Respiratory Father      COPD     Cardiovascular Son      aortic stenosis       Review of Systems:  A complete review of systems reviewed by me is negative with the exeption of what has been mentioned in the history of present illness.  CONSTITUTIONAL:  POSITIVE for  weight gain  EYES:  POSITIVE for changes in vision  ENT: NEGATIVE for ear pain, sore throat, sinus pain, post-nasal drip, runny nose, bloody nose  CARDIAC:  POSITIVE for  HTN  NEUROLOGIC: NEGATIVE headaches, weakness or numbness in the arms or legs.  DERMATOLOGIC: NEGATIVE for rashes, new moles or change in mole(s)  PULMONARY:  POSITIVE for  SOB at rest  GASTROINTESTINAL: NEGATIVE for nausea or vomitting, loose or watery stools, fat or grease in stools, constipation, abdominal pain, bowel movements black in color or blood noted.  GENITOURINARY: NEGATIVE for pain during urination, blood in urine, urinating more frequently than usual, irregular menstrual periods.  MUSCULOSKELETAL: NEGATIVE for muscle pain, bone or joint pain, swollen joints.  ENDOCRINE: NEGATIVE for increased thirst or urination, diabetes.  LYMPHATIC: NEGATIVE for swollen lymph nodes, lumps or bumps in the breasts or nipple discharge.    Physical Examination:  Vitals: BP (!) 154/91  Pulse 90  Ht 1.626 m (5' 4.02\")  Wt 83 kg (183 lb)  SpO2 94%  BMI 31.4 kg/m2  BMI= Body mass index is 31.4 kg/(m^2).    Neck Cir (cm): 38 cm    Ohlman Total Score 12/21/2017   Total score - Ohlman 4     GENERAL APPEARANCE: healthy, alert, no distress and over weight  RESP: lungs clear to auscultation - no rales, rhonchi or wheezes  CV: regular rates and rhythm, normal S1 S2, no S3 or S4 and no murmur, click or rub  PSYCH: mentation appears normal and affect normal/bright    Impression/Plan:    Britt German is a 53 yo female w/ PHM of severe LULÚ and HTN who was seen in the clinic to explore " alternative treatment for her LULÚ. Although data extracted from the CPAP underscores its efficacy, patient clearly indicated that she sees no future for this treatment modality because struggling to tolerate the device during use. She expressed interest in a hypoglossal nerve stimulator procedure, and meets the weight criteria as well as the LULÚ severity criteria according to the old study which will have to be repeated.Subsequently, Mrs German agreed to scheduling an inpatient SS and was instructed to stop using her CPAP as of right now. The next step would be referral to the ENT specialist to establish whether she is likely to benefit from the UAS vs a more traditional approach, if she still meets the severity criteria per repeated SS.    1. Repeat in-lab PSG  2. D/c CPAP use ~1-2 weeks prior to PSG.  3. Referral to ENT for initial airway eval and treatment options discussion.    Scribe Disclosure:   Reinier FENG, MS4, am serving as a scribe; to document services personally performed by Dr. Niraj Lopez, based on data collection and the provider's statements to me.     Provider Disclosure:  INiraj, agree with above History, Review of Systems, Physical exam and Plan.  I have reviewed the content of the documentation and have edited it as needed. I have personally performed the services documented here and the documentation accurately represents those services and the decisions I have made.      I have spent 60 minutes with this patient today in which greater than 50% of this time was spent in the counseling / coordination of care.      Niraj Lopez MD      CC: No ref. provider found

## 2017-12-21 NOTE — MR AVS SNAPSHOT
After Visit Summary   12/21/2017    Britt German    MRN: [id number]           Patient Information     Date Of Birth          1965        Visit Information        Provider Department      12/21/2017 11:00 AM Niraj Lopez MD [facility]        Today's Diagnoses     LULÚ (obstructive sleep apnea)    -  1      Care Instructions      Your BMI is Body mass index is 31.4 kg/(m^2).  Weight management is a personal decision.  If you are interested in exploring weight loss strategies, the following discussion covers the approaches that may be successful. Body mass index (BMI) is one way to tell whether you are at a healthy weight, overweight, or obese. It measures your weight in relation to your height.  A BMI of 18.5 to 24.9 is in the healthy range. A person with a BMI of 25 to 29.9 is considered overweight, and someone with a BMI of 30 or greater is considered obese. More than two-thirds of American adults are considered overweight or obese.  Being overweight or obese increases the risk for further weight gain. Excess weight may lead to heart disease and diabetes.  Creating and following plans for healthy eating and physical activity may help you improve your health.  Weight control is part of healthy lifestyle and includes exercise, emotional health, and healthy eating habits. Careful eating habits lifelong are the mainstay of weight control. Though there are significant health benefits from weight loss, long-term weight loss with diet alone may be very difficult to achieve- studies show long-term success with dietary management in less than 10% of people. Attaining a healthy weight may be especially difficult to achieve in those with severe obesity. In some cases, medications, devices and surgical management might be considered.  What can you do?  If you are overweight or obese and are interested in methods for weight loss, you should discuss this with your provider.      Consider reducing daily calorie intake by 500 calories.     Keep a food journal.     Avoiding skipping meals, consider cutting portions instead.    Diet combined with exercise helps maintain muscle while optimizing fat loss. Strength training is particularly important for building and maintaining muscle mass. Exercise helps reduce stress, increase energy, and improves fitness. Increasing exercise without diet control, however, may not burn enough calories to loose weight.       Start walking three days a week 10-20 minutes at a time    Work towards walking thirty minutes five days a week     Eventually, increase the speed of your walking for 1-2 minutes at time    In addition, we recommend that you review healthy lifestyles and methods for weight loss available through the National Institutes of Health patient information sites:  http://win.niddk.nih.gov/publications/index.htm    And look into health and wellness programs that may be available through your health insurance provider, employer, local community center, or lamar club.    Weight management plan: Patient was referred to their PCP to discuss a diet and exercise plan.            Follow-ups after your visit        Additional Services     SLEEP ENT REFERRAL       Rubi Hsia  [facility]  [address] [place] [address]  [phone]                  Your next 10 appointments already scheduled     Jan 09, 2018  8:00 PM CST   PSG Diagnostic with SLEEP LAB, BED TWO   [facility])    [address] [facility] [address]   [phone]            Jan 12, 2018 12:00 PM CST   Telephone Visit with Niraj Lopez MD   [facility])    [address] [facility] [address]   [phone]           Note: this is not an onsite visit; there is no need to come to the facility.              Who to contact     If  "you have questions or need follow up information about today's clinic visit or your schedule please contact [facility] directly at [phone].  Normal or non-critical lab and imaging results will be communicated to you by MyChart, letter or phone within 4 business days after the clinic has received the results. If you do not hear from us within 7 days, please contact the clinic through MyChart or phone. If you have a critical or abnormal lab result, we will notify you by phone as soon as possible.  Submit refill requests through [employer] or call your pharmacy and they will forward the refill request to us. Please allow 3 business days for your refill to be completed.          Additional Information About Your Visit        [employer] Information     [employer] gives you secure access to your electronic health record. If you see a primary care provider, you can also send messages to your care team and make appointments. If you have questions, please call your primary care clinic.  If you do not have a primary care provider, please call [phone] and they will assist you.        Care EveryWhere ID     This is your Care EveryWhere ID. This could be used by other organizations to access your [facility] medical records  [id number]        Your Vitals Were     Pulse Height Pulse Oximetry BMI (Body Mass Index)          90 1.626 m (5' 4.02\") 94% 31.4 kg/m2         Blood Pressure from Last 3 Encounters:   12/21/17 148/90   12/14/17 131/84   12/06/17 (!) 185/110    Weight from Last 3 Encounters:   12/21/17 83 kg (183 lb)   12/14/17 83 kg (183 lb)   12/06/17 80.3 kg (177 lb)              We Performed the Following     SLEEP ENT REFERRAL        Primary Care Provider Office Phone # Fax #    Shawna Preston [phone]       [place] [address] [place] [address]        Equal Access to Services     BRIAN CASTILLO AH: Hadii arian loveo Sobreanna, waaxda luqadaha, qaybta kaalmada " zion wilburnjc koromaaaanirudh ah. Syl[facility] [phone].    ATENCIÓN: Si habla kely, tiene a trimble disposición servicios gratuitos de asistencia lingüística. Maynor al [phone].    We comply with applicable federal civil rights laws and [place] laws. We do not discriminate on the basis of race, color, national origin, age, disability, sex, sexual orientation, or gender identity.            Thank you!     Thank you for choosing [facility]  for your care. Our goal is always to provide you with excellent care. Hearing back from our patients is one way we can continue to improve our services. Please take a few minutes to complete the written survey that you may receive in the mail after your visit with us. Thank you!             Your Updated Medication List - Protect others around you: Learn how to safely use, store and throw away your medicines at www.disposemymeds.org.          This list is accurate as of: 12/21/17 11:59 PM.  Always use your most recent med list.                   Brand Name Dispense Instructions for use Diagnosis    amLODIPine 2.5 MG tablet    NORVASC    90 tablet    Take 1 tablet (2.5 mg) by mouth daily    Benign essential hypertension       B-D ASSURE BPM/AUTO ARM CUFF Misc     1 each    1 Device 3 times daily.    Elevated blood pressure reading without diagnosis of hypertension       metoprolol 25 MG tablet    LOPRESSOR    180 tablet    Take 1 tablet (25 mg) by mouth 2 times daily    Benign essential hypertension, Severe hypertension

## 2017-12-21 NOTE — PATIENT INSTRUCTIONS

## 2017-12-29 ENCOUNTER — HOSPITAL ENCOUNTER (OUTPATIENT)
Dept: MAMMOGRAPHY | Facility: CLINIC | Age: 52
Discharge: HOME | End: 2017-12-29
Attending: FAMILY MEDICINE | Admitting: FAMILY MEDICINE
Payer: COMMERCIAL

## 2017-12-29 DIAGNOSIS — Z12.31 VISIT FOR SCREENING MAMMOGRAM: ICD-10-CM

## 2017-12-29 PROCEDURE — G0202 SCR MAMMO BI INCL CAD: HCPCS

## 2018-01-09 ENCOUNTER — THERAPY VISIT (OUTPATIENT)
Dept: SLEEP MEDICINE | Facility: CLINIC | Age: 53
End: 2018-01-09
Payer: COMMERCIAL

## 2018-01-09 DIAGNOSIS — G47.33 OSA (OBSTRUCTIVE SLEEP APNEA): ICD-10-CM

## 2018-01-09 PROCEDURE — 95810 POLYSOM 6/> YRS 4/> PARAM: CPT | Performed by: FAMILY MEDICINE

## 2018-01-09 NOTE — MR AVS SNAPSHOT
After Visit Summary   1/9/2018    Britt German    MRN: [id number]           Patient Information     Date Of Birth          1965        Visit Information        Provider Department      1/9/2018 8:00 PM SLEEP LAB, BED TWO [facility]        Today's Diagnoses     LULÚ (obstructive sleep apnea)           Follow-ups after your visit        Your next 10 appointments already scheduled     Jan 12, 2018 12:00 PM CST   Telephone Visit with Niraj Lopez MD   [facility] ([facility])    [address]  [facility] [address]   [phone]           Note: this is not an onsite visit; there is no need to come to the facility.              Who to contact     If you have questions or need follow up information about today's clinic visit or your schedule please contact [facility] directly at [phone].  Normal or non-critical lab and imaging results will be communicated to you by MyChart, letter or phone within 4 business days after the clinic has received the results. If you do not hear from us within 7 days, please contact the clinic through [employer] or phone. If you have a critical or abnormal lab result, we will notify you by phone as soon as possible.  Submit refill requests through [employer] or call your pharmacy and they will forward the refill request to us. Please allow 3 business days for your refill to be completed.          Additional Information About Your Visit        [employer] Information     [employer] gives you secure access to your electronic health record. If you see a primary care provider, you can also send messages to your care team and make appointments. If you have questions, please call your primary care clinic.  If you do not have a primary care provider, please call [phone] and they will assist you.        Care EveryWhere ID     This is your Care EveryWhere ID. This could be used by other  organizations to access your [place] medical records  [id number]         Blood Pressure from Last 3 Encounters:   12/21/17 148/90   12/14/17 131/84   12/06/17 (!) 185/110    Weight from Last 3 Encounters:   12/21/17 83 kg (183 lb)   12/14/17 83 kg (183 lb)   12/06/17 80.3 kg (177 lb)              We Performed the Following     Comprehensive Sleep Study        Primary Care Provider Office Phone # Fax #    Shawna Preston [phone]       [facility] [address] [place] [address]        Equal Access to Services     SEE CASTILLO : Hadii aad beck hadasho Sobreanna, waaxda luqadaha, qaybta kaalmada adeegyada, zion villarreal. So [facility] [phone].    ATENCIÓN: Si habla español, tiene a trimble disposición servicios gratuitos de asistencia lingüística. [facility] [phone].    We comply with applicable federal civil rights laws and [place] laws. We do not discriminate on the basis of race, color, national origin, age, disability, sex, sexual orientation, or gender identity.            Thank you!     Thank you for choosing [facility]  for your care. Our goal is always to provide you with excellent care. Hearing back from our patients is one way we can continue to improve our services. Please take a few minutes to complete the written survey that you may receive in the mail after your visit with us. Thank you!             Your Updated Medication List - Protect others around you: Learn how to safely use, store and throw away your medicines at www.disposemymeds.org.          This list is accurate as of: 1/9/18 11:59 PM.  Always use your most recent med list.                   Brand Name Dispense Instructions for use Diagnosis    amLODIPine 2.5 MG tablet    NORVASC    90 tablet    Take 1 tablet (2.5 mg) by mouth daily    Benign essential hypertension       B-D ASSURE BPM/AUTO ARM CUFF Misc     1 each    1 Device 3 times daily.    Elevated blood  pressure reading without diagnosis of hypertension       metoprolol 25 MG tablet    LOPRESSOR    180 tablet    Take 1 tablet (25 mg) by mouth 2 times daily    Benign essential hypertension, Severe hypertension

## 2018-01-10 NOTE — PROGRESS NOTES
Pt arrived at [facility] for sleep study.    Diagnostic PSG completed per provider order.  Patient met criteria for PAP therapy.

## 2018-01-12 ENCOUNTER — DOCUMENTATION ONLY (OUTPATIENT)
Dept: SLEEP MEDICINE | Facility: CLINIC | Age: 53
End: 2018-01-12

## 2018-01-12 ENCOUNTER — VIRTUAL VISIT (OUTPATIENT)
Dept: SLEEP MEDICINE | Facility: CLINIC | Age: 53
End: 2018-01-12
Payer: COMMERCIAL

## 2018-01-12 DIAGNOSIS — G47.33 OSA (OBSTRUCTIVE SLEEP APNEA): Primary | ICD-10-CM

## 2018-01-12 PROCEDURE — 99442 ZZC PHYSICIAN TELEPHONE EVALUATION 11-20 MIN: CPT | Performed by: FAMILY MEDICINE

## 2018-01-12 NOTE — MR AVS SNAPSHOT
After Visit Summary   1/12/2018    Britt German    MRN: [id number]           Patient Information     Date Of Birth          1965        Visit Information        Provider Department      1/12/2018 12:00 PM Niraj Lopez MD [facility]        Today's Diagnoses     LULÚ (obstructive sleep apnea)    -  1       Follow-ups after your visit        Additional Services     SLEEP ENT REFERRAL       [facility]  [address] [place] [address]  4th [place] [address]  [phone]                  Follow-up notes from your care team     Return if symptoms worsen or fail to improve.      Who to contact     If you have questions or need follow up information about today's clinic visit or your schedule please contact [facility] directly at [phone].  Normal or non-critical lab and imaging results will be communicated to you by MyChart, letter or phone within 4 business days after the clinic has received the results. If you do not hear from us within 7 days, please contact the clinic through MyChart or phone. If you have a critical or abnormal lab result, we will notify you by phone as soon as possible.  Submit refill requests through [employer] or call your pharmacy and they will forward the refill request to us. Please allow 3 business days for your refill to be completed.          Additional Information About Your Visit        MyChart Information     [employer] gives you secure access to your electronic health record. If you see a primary care provider, you can also send messages to your care team and make appointments. If you have questions, please call your primary care clinic.  If you do not have a primary care provider, please call [phone] and they will assist you.        Care EveryWhere ID     This is your Care EveryWhere ID. This could be used by other organizations to access your [place] medical records  [id number]          Blood Pressure from Last 3 Encounters:   12/21/17 148/90   12/14/17 131/84   12/06/17 (!) 185/110    Weight from Last 3 Encounters:   12/21/17 83 kg (183 lb)   12/14/17 83 kg (183 lb)   12/06/17 80.3 kg (177 lb)              We Performed the Following     SLEEP ENT REFERRAL        Primary Care Provider Office Phone # Fax #    Shawna Preston [phone]       [place] [address] [place] [address]        Equal Access to Services     [facility]: Hadii aad ku hadasho Soomaali, waaxda luqadaha, qaybta kaalmada adeegyateresa, zion arenas . So [facility] [phone].    ATENCIÓN: Si habla español, tiene a trimble disposición servicios gratuitos de asistencia lingüística. Ll[facility] [phone].    We comply with applicable federal civil rights laws and [place] laws. We do not discriminate on the basis of race, color, national origin, age, disability, sex, sexual orientation, or gender identity.            Thank you!     Thank you for choosing [facility]  for your care. Our goal is always to provide you with excellent care. Hearing back from our patients is one way we can continue to improve our services. Please take a few minutes to complete the written survey that you may receive in the mail after your visit with us. Thank you!             Your Updated Medication List - Protect others around you: Learn how to safely use, store and throw away your medicines at www.disposemymeds.org.          This list is accurate as of: 1/12/18 12:02 PM.  Always use your most recent med list.                   Brand Name Dispense Instructions for use Diagnosis    amLODIPine 2.5 MG tablet    NORVASC    90 tablet    Take 1 tablet (2.5 mg) by mouth daily    Benign essential hypertension       B-D ASSURE BPM/AUTO ARM CUFF Misc     1 each    1 Device 3 times daily.    Elevated blood pressure reading without diagnosis of hypertension       metoprolol tartrate 25 MG tablet     LOPRESSOR    180 tablet    Take 1 tablet (25 mg) by mouth 2 times daily    Benign essential hypertension, Severe hypertension

## 2018-01-12 NOTE — PROCEDURES
" SLEEP STUDY INTERPRETATION  POLYSOMNOGRAPHY REPORT      Patient: EVELYN ROBERT  YOB: 1965  Study Date: 1/9/2018  MRN: [id number]  Referring Provider: Niraj Lopez MD  Ordering Provider: Niraj Lopez MD    Indications for Polysomnography: The patient is a 52 y old Female who is 5' 4\" and weighs 183.0 lbs.  Her BMI is 31.6, [place] sleepiness scale 4.0 and neck size is 38.0.  Relevant medical history includes severe LULÚ, recalcitrant HTN. A diagnostic polysomnogram was performed to evaluate for potential consideration of hypoglossal nerve stimulation.    Prior Sleep Study:  5/21/2013 - PSG with weight 168 lbs, AHI 43, angel SpO2 85%, CPAP 10 effective.    Polysomnogram Data:  A full night polysomnogram recorded the standard physiologic parameters including EEG, EOG, EMG, ECG, nasal and oral airflow.  Respiratory parameters of chest and abdominal movements were recorded with respiratory inductance plethysmography.  Oxygen saturation was recorded by pulse oximetry.      Sleep Architecture:   The total recording time of the polysomnogram was 440.9 minutes.  The total sleep time was 375.0 minutes.  Sleep latency was decreased at 6.8 minutes without the use of a sleep aid.  REM latency was 64.5 minutes.  Arousal index was increased at 33.6 arousals per hour.  Sleep efficiency was normal at 85.0%.  Wake after sleep onset was 58.5 minutes.  The patient spent 14.5% of total sleep time in Stage N1, 58.5% in Stage N2, 12.8% in Stages N3, and 14.1% in REM.  Time in REM supine was 32.0 minutes.    Respiration: Severe LULÚ (AHI 46.7, RDI 48.5) with mild sleep-associated hypoxemia.  Seen to have strong position component (supine .1, lateral AHI 4.7).    Events - The polysomnogram revealed a presence of 107 obstructive, 5 central, and - mixed apneas resulting in an apnea index of 17.9 events per hour.  There were 180 hypopneas resulting in a hypopnea index of 28.8 events per hour.  The combined " apnea/hypopnea index was 46.7 events per hour.  The REM AHI was 45.3 events per hour.  The supine AHI was 104.1 events per hour.  The RERA index was 1.8 events per hour.   The RDI was 48.5 events per hour.    Snoring - was reported as mild to moderate.    Respiratory rate and pattern - was notable for normal respiratory rate and pattern.    Sustained Sleep Associated Hypoventilation - Transcutaneous carbon dioxide monitoring was not used, however significant hypoventilation was not suggested by oximetry.    Sleep Associated Hypoxemia - (Greater than 5 minutes O2 sat below 89%) was present.  Baseline oxygen saturation was 94.7%. Lowest oxygen saturation was 75.2%.  Time spent less than or equal to 88% was 5.7 minutes.  Time spent less than or equal to 89% was 7.9 minutes.  48.5 1.8 46.7     Movement Activity: Infrequent PLM s (index 13.1) with rare cortical arousals (index 0.2)    Periodic Limb Activity - There were 82 PLMs during the entire study. The PLM index was 13.1 movements per hour.  The PLM Arousal Index was 0.2 per hour.    REM EMG Activity - Excessive transient / sustained muscle activity was not present.    Nocturnal Behavior - Abnormal sleep related behaviors were not noted during / arising out of NREM / REM sleep.    Bruxism - None apparent.    Cardiac Summary: Appears NSR  The average pulse rate was 68.7 bpm.  The minimum pulse rate was 53.9 bpm while the maximum pulse rate was 100.0 bpm. The rhythm is normal sinus. Arrhythmias were not noted.      Assessment:     Severe LULÚ (AHI 46.7, RDI 48.5) with mild sleep-associated hypoxemia.  Seen to have strong position component (supine .1, lateral AHI 4.7).    Infrequent PLM s (index 13.1) with rare cortical arousals (index 0.2).    Recommendations:    Appears to meet basic criteria for consideration of hypoglossal nerve stimulation therapy, consider referral with sleep ENT.    Consider positional therapy.    Weight management (if BMI >  30).    Pharmacologic therapy should be used for management of restless legs syndrome only if present and clinically indicated and not based on the presence of periodic limb movements alone.    Diagnostic Codes:     Obstructive Sleep Apnea G47.33    Unspecified Sleep Disturbance G47.9                     Range(%) Time in range (min) Time in range (%) Time in or below range (min) Time in or below range (%)   0.0 - 89.0 7.9 1.8% 7.9 1.8%   0.0 - 88.0 5.7 1.3% 5.7 1.3%      46.7 104.1 45.3

## 2018-01-12 NOTE — PROGRESS NOTES
"Britt German is a 52 year old female who is being evaluated via a telephone visit.      The patient has been notified of following:     \"This telephone visit will be conducted via a call between you and your physician/provider. We have found that certain health care needs can be provided without the need for a physical exam.  This service lets us provide the care you need with a short phone conversation.  If a prescription is necessary we can send it directly to your pharmacy.  If lab work is needed we can place an order for that and you can then stop by our lab to have the test done at a later time.    We will bill your insurance company for this service.  Please check with your medical insurance if this type of visit is covered. You may be responsible for the cost of this type of visit if insurance coverage is denied.  The typical cost is $30 (10min), $59 (11-20min) and $85 (21-30min).  Most often these visits are shorter than 10 minutes.    If during the course of the call the physician/provider feels a telephone visit is not appropriate, you will not be charged for this service.\"       Consent has been obtained for this service by 2 care team members: yes. See the scanned image in the medical record.    Britt German complains of  Sleep Problem      I have reviewed and updated the patient's Past Medical History, Social History, Family History and Medication List.    ALLERGIES  Hydrochlorothiazide and Lisinopril    Norma Marti -[facility]  Sleep DME Coordinator  (MA signature)    Additional provider notes: Reviewed PSG results from 1/9/2018, performed as all-night diagnostic for updated diagnosis given consideration for upper airway stimulation therapy.  Weight 183 lbs, BMI 31.6, AHI 46.7, RDI 48.5, angel SpO2 75.2% in supine REM, time of SpO2 <= 88% of 5.7 minutes.  Noted to have very strong positional component (supine .1, lateral AHI 4.7).    Assessment/Plan:  - Severe LULÚ with mild sleep-associated " hypoxemia  - Appears to meet basic criteria for upper airway stimulation therapy, will proceed with referral to Dr. Cobb  - Also discussed positional therapy and gave information today.    I have reviewed the note as documented above.  This accurately captures the substance of my conversation with the patient,  Britt    Total time of call between patient and provider was 15 minutes

## 2018-01-24 NOTE — TELEPHONE ENCOUNTER
APPT INFO    Date /Time: 2/1/18 at 11:30   Reason for Appt: Inspire   Ref Provider/Clinic: Dr. Lopez   Are there internal records? Yes/No?  IF YES, list clinic names: Yes;  1/9/18 Sleep Study ([facility])   [facility]   Are there outside records? Yes/No? No   Patient Contact (Y/N) & Call Details: No   Action: Chart Reviewed

## 2018-01-25 ENCOUNTER — CARE COORDINATION (OUTPATIENT)
Dept: OTOLARYNGOLOGY | Facility: CLINIC | Age: 53
End: 2018-01-25

## 2018-01-25 NOTE — PROGRESS NOTES
Received a message from our call center stating that the patient had questions about Inspire.  She is currently scheduled to see Dr. Cobb on 2/1. VM was left encouraging to keep that appointment and to discuss her questions at that time.    Rubi Dong R.N., B.S.N.  Nurse Coordinator Head & Neck Surgery  [phone]  1/25/2018 11:30 AM

## 2018-02-01 ENCOUNTER — OFFICE VISIT (OUTPATIENT)
Dept: OTOLARYNGOLOGY | Facility: CLINIC | Age: 53
End: 2018-02-01
Payer: COMMERCIAL

## 2018-02-01 ENCOUNTER — PRE VISIT (OUTPATIENT)
Dept: OTOLARYNGOLOGY | Facility: CLINIC | Age: 53
End: 2018-02-01

## 2018-02-01 VITALS — BODY MASS INDEX: 31.07 KG/M2 | WEIGHT: 182 LBS | HEIGHT: 64 IN

## 2018-02-01 DIAGNOSIS — G47.33 OSA (OBSTRUCTIVE SLEEP APNEA): Primary | ICD-10-CM

## 2018-02-01 ASSESSMENT — PAIN SCALES - GENERAL: PAINLEVEL: NO PAIN (0)

## 2018-02-01 NOTE — PATIENT INSTRUCTIONS
1.  You were seen in the ENT Clinic today by Dr. Cobb.  If you have any questions or concerns after your appointment, please call [phone].  Press option #1 for scheduling related needs.  Press option #3 for Nurse advice.  2.  Plan is to return to clinic

## 2018-02-01 NOTE — NURSING NOTE
"Chief Complaint   Patient presents with     Consult     Consultation for Inspire device      Height 1.626 m (5' 4\"), weight 82.6 kg (182 lb), not currently breastfeeding.    Angelito Butt    "

## 2018-02-01 NOTE — PROGRESS NOTES
SLEEP SURGERY CONSULTATION    Patient: Britt German  : 1965  CHIEF COMPLAINT: LULÚ    IDENTIFICATION: Dr. Lopez consulted Dr. Cobb for surgical evaluation and possible treatment of obstructive sleep apnea syndrome for Britt German.    HPI:  Britt German is a 52 year old year old female who has Obstructive Sleep Apnea.  The patient has a history of severe obstructive sleep apnea diagnosed initially in .  At that time, the overall AHI was 43 with the lowest oxygen saturation of 85%.  The patient struggled with CPAP.  Essentially, whenever she would turn in bed, she would dislodge the mask and awaken due to leaks.  She recently reestablished Sleep Medicine care at [facility].  She had a repeat sleep study performed on 2018.  This was an in-lab sleep study.  This showed an overall AHI of 46.7 with a lowest oxygen saturation of 75%.  Of note, patient had extreme positional dependence to her sleep apnea.  Her overall supine AHI was 104.1.  Her non-supine AHI was 4.7.  The patient had a discussion with Dr. Lopez about management of her sleep apnea.  She did not feel that she would be able to use CPAP again.  He did discuss with her positional therapy.  He felt that she met basic criteria for upper airway stimulation therapy and referred her to me for further evaluation.  The patient is currently using a body positioner.  She reports that she does have a home sleep study scheduled to see if that is effective in treating her sleep apnea.  In addition, the patient complains of difficulty breathing even during the day when she is walking around.  She does have a history of lung nodules.  She feels like she gets short of breath when she is moving around.       PAST MEDICAL HISTORY:  Past Medical History:   Diagnosis Date     Hypertension      Obstructive sleep apnea      Recurrent UTI        PAST SURGICAL HISTORY:  Past Surgical History:   Procedure Laterality Date     ABDOMINOPLASTY       [facility] [place]     ARTHROSCOPY ANKLE Left 2016    Procedure: ARTHROSCOPY ANKLE;  Surgeon: Zac Cuello DPM;  Location: [facility]     C/SECTION, LOW TRANSVERSE  ,     , Low Transverse     CL AFF SURGICAL PATHOLOGY  ,     Breast reduction     D & C       DILATION AND CURETTAGE, HYSTEROSCOPY, ABLATE ENDOMETRIUM NOVASURE, COMBINED  2014    Procedure: COMBINED DILATION AND CURETTAGE, HYSTEROSCOPY, ABLATE ENDOMETRIUM NOVASURE;  Surgeon: Rachel Hernandez DO;  Location: [facility]     INCISION AND DRAINAGE LOWER EXTREMITY, COMBINED  2011    COMBINED INCISION AND DRAINAGE LOWER EXTREMITY performed by LEY, JEFFREY DUANE at [facility]     OPEN REDUCTION INTERNAL FIXATION ANKLE Left 2015    Procedure: OPEN REDUCTION INTERNAL FIXATION ANKLE;  Surgeon: Zac Cuello DPM;  Location: [facility]     REMOVE HARDWARE ANKLE  2011    REMOVE HARDWARE ANKLE performed by LEY, JEFFREY DUANE at [facility]     REMOVE HARDWARE ANKLE Left 2016    Procedure: REMOVE HARDWARE ANKLE;  Surgeon: Zac Cuello DPM;  Location: [facility]     SURGICAL HISTORY OF -       Right ankle fracture, ORIF     TUBAL LIGATION         MEDICATIONS:  Current Outpatient Prescriptions   Medication Sig Dispense Refill     metoprolol (LOPRESSOR) 25 MG tablet Take 1 tablet (25 mg) by mouth 2 times daily 180 tablet 3     amLODIPine (NORVASC) 2.5 MG tablet Take 1 tablet (2.5 mg) by mouth daily 90 tablet 1     Blood Pressure Monitoring (B-D ASSURE BPM/AUTO ARM CUFF) MISC 1 Device 3 times daily. (Patient not taking: Reported on 2018) 1 each 0       ALLERGIES:  Allergies   Allergen Reactions     Hydrochlorothiazide Rash     Lisinopril      Hives       SOCIAL HISTORY:  Social History     Social History     Marital status:      Spouse name: N/A     Number of children: 2     Years of education: N/A     Occupational History     [employer]     Social History Main Topics     Smoking  "status: Passive Smoke Exposure - Never Smoker     Packs/day: 0.00     Years: 1.00     Types: Cigarettes     Start date: 9/10/1981     Last attempt to quit: 1/1/1983     Smokeless tobacco: Never Used     Alcohol use Yes      Comment: 2-3 times weekly     Drug use: No     Sexual activity: Yes     Partners: Male     Birth control/ protection: None      Comment: tubal ligation 1994     Other Topics Concern     Parent/Sibling W/ Cabg, Mi Or Angioplasty Before 65f 55m? No     Social History Narrative       FAMILY HISTORY:  Family History   Problem Relation Age of Onset     Breast Cancer Mother      dx age 38     DIABETES Sister      Cardiovascular Father      CHF     Respiratory Father      COPD     Cardiovascular Son      aortic stenosis     DIABETES Sister        REVIEW OF SYSTEMS:   ENT ROS 2/1/2018   Constitutional Weight gain, Unexplained fever or night sweats   Neurology Headache   Eyes Visual loss   Ears, Nose, Throat Ear pain, Sore throat   Cardiopulmonary Breathing problems   Gastrointestinal/Genitourinary Heartburn/indigestion         PHYSICAL EXAM  Ht 1.626 m (5' 4\")  Wt 82.6 kg (182 lb)  BMI 31.24 kg/m2    Constitutional: healthy, alert and no distress  ENT:   NOSE:  Septum deviated mildly to the left.  Turbinates normal  MOUTH:   MMM 3, tonsils 1+    DIAGNOSTIC PROCEDURES:  Fiberoptic Laryngoscopy is performed to examine the upper airway for pathology, functional or anatomic abnormality.  After application of topical anesthetic/decongestant solution the flexible endoscope was passed into each nasal cavity separately.  Findings are as follows:   Nasal passages appear clear bilaterally.  In the nasopharynx, there is no obstructive adenoid tissue.  The patient has a normal base of tongue with no masses or lesions.  There is normal larynx with no vocal cord pathology.       ASSESSMENT:  1.  Severe obstructive sleep apnea,     2. Shortness of breath    PLAN:  The patient is here for consideration for " hypoglossal nerve stimulation therapy.  I do not think the patient is a candidate for hypoglossal nerve stimulation therapy based off of the positional dependence of her sleep apnea.  Her supine AHI is greater than 65.  We have found that patients with supine AHIs greater than 65 do not do as well with the implant.  I do not think that she would also be a candidate for other surgical procedures.  I do think that positional therapy would be her best option.  I also have reassured the patient that I did not see any evidence of upper airway lesions or masses that might be causing her shortness of breath.       I spent 35 minutes face-to-face with Britt German during today's office visit, of which more than 50% was spent on counseling and coordination of care, which included discussion of pathophysiology of patient's obstructive sleep apnea, treatment options, risks and benefits of each option.

## 2018-02-01 NOTE — MR AVS SNAPSHOT
After Visit Summary   2/1/2018    Britt German    MRN: [id number]           Patient Information     Date Of Birth          1965        Visit Information        Provider Department      2/1/2018 11:30 AM Rubi Cobb MD [facility] Ear Nose and Throat        Today's Diagnoses     LULÚ (obstructive sleep apnea)    -  1      Care Instructions    1.  You were seen in the ENT Clinic today by Dr. Cobb.  If you have any questions or concerns after your appointment, please call [phone].  Press option #1 for scheduling related needs.  Press option #3 for Nurse advice.  2.  Plan is to return to clinic               Follow-ups after your visit        Who to contact     Please call your clinic at [phone] to:    Ask questions about your health    Make or cancel appointments    Discuss your medicines    Learn about your test results    Speak to your doctor   If you have compliments or concerns about an experience at your clinic, or if you wish to file a complaint, please contact [facility] Patient Relations at [phone] or email us at [email or web address]         Additional Information About Your Visit        MyChart Information     [employer] gives you secure access to your electronic health record. If you see a primary care provider, you can also send messages to your care team and make appointments. If you have questions, please call your primary care clinic.  If you do not have a primary care provider, please call [phone] and they will assist you.      [employer] is an electronic gateway that provides easy, online access to your medical records. With [employer], you can request a clinic appointment, read your test results, renew a prescription or communicate with your care team.     To access your existing account, please contact your [facility] Clinic or call [phone] for assistance.        Care EveryWhere ID     This is  "your Care EveryWhere ID. This could be used by other organizations to access your [place] medical records  [id number]        Your Vitals Were     Height BMI (Body Mass Index)                1.626 m (5' 4\") 31.24 kg/m2           Blood Pressure from Last 3 Encounters:   12/21/17 148/90   12/14/17 131/84   12/06/17 (!) 185/110    Weight from Last 3 Encounters:   02/01/18 82.6 kg (182 lb)   12/21/17 83 kg (183 lb)   12/14/17 83 kg (183 lb)              Today, you had the following     No orders found for display       Primary Care Provider Office Phone # Fax #    Shawna Preston [phone]       [place] [address] [place] [address]        Equal Access to Services     BRIAN CASTILLO : Hadii arian solares hadasho Soomaali, waaxda luqadaha, qaybta kaalmada adeegyada, zion arenas . So [facility] [phone].    ATENCIÓN: Si habla español, tiene a trimble disposición servicios gratuitos de asistencia lingüística. Maynor al [phone].    We comply with applicable federal civil rights laws and [place] laws. We do not discriminate on the basis of race, color, national origin, age, disability, sex, sexual orientation, or gender identity.            Thank you!     Thank you for choosing [facility] EAR NOSE AND THROAT  for your care. Our goal is always to provide you with excellent care. Hearing back from our patients is one way we can continue to improve our services. Please take a few minutes to complete the written survey that you may receive in the mail after your visit with us. Thank you!             Your Updated Medication List - Protect others around you: Learn how to safely use, store and throw away your medicines at www.disposemymeds.org.          This list is accurate as of 2/1/18 11:59 PM.  Always use your most recent med list.                   Brand Name Dispense Instructions for use Diagnosis    amLODIPine 2.5 MG tablet    NORVASC    90 tablet    Take 1 tablet (2.5 " mg) by mouth daily    Benign essential hypertension       B-D ASSURE BPM/AUTO ARM CUFF Misc     1 each    1 Device 3 times daily.    Elevated blood pressure reading without diagnosis of hypertension       metoprolol tartrate 25 MG tablet    LOPRESSOR    180 tablet    Take 1 tablet (25 mg) by mouth 2 times daily    Benign essential hypertension, Severe hypertension

## 2018-02-01 NOTE — LETTER
2018       RE: Britt German  [address] [facility] [address]     Dear Colleague,    Thank you for referring your patient, Britt German, to the [facility] at [facility]. Please see a copy of my visit note below.        SLEEP SURGERY CONSULTATION    Patient: Britt German  : 1965  CHIEF COMPLAINT: LULÚ    IDENTIFICATION: Dr. Lopez consulted Dr. Cobb for surgical evaluation and possible treatment of obstructive sleep apnea syndrome for Britt German.    HPI:  Britt German is a 52 year old year old female who has Obstructive Sleep Apnea.  The patient has a history of severe obstructive sleep apnea diagnosed initially in .  At that time, the overall AHI was 43 with the lowest oxygen saturation of 85%.  The patient struggled with CPAP.  Essentially, whenever she would turn in bed, she would dislodge the mask and awaken due to leaks.  She recently reestablished Sleep Medicine care at [facility].  She had a repeat sleep study performed on 2018.  This was an in-lab sleep study.  This showed an overall AHI of 46.7 with a lowest oxygen saturation of 75%.  Of note, patient had extreme positional dependence to her sleep apnea.  Her overall supine AHI was 104.1.  Her non-supine AHI was 4.7.  The patient had a discussion with Dr. Lopez about management of her sleep apnea.  She did not feel that she would be able to use CPAP again.  He did discuss with her positional therapy.  He felt that she met basic criteria for upper airway stimulation therapy and referred her to me for further evaluation.  The patient is currently using a body positioner.  She reports that she does have a home sleep study scheduled to see if that is effective in treating her sleep apnea.  In addition, the patient complains of difficulty breathing even during the day when she is walking around.  She does have a history of lung nodules.  She feels like she  gets short of breath when she is moving around.       PAST MEDICAL HISTORY:  Past Medical History:   Diagnosis Date     Hypertension      Obstructive sleep apnea      Recurrent UTI        PAST SURGICAL HISTORY:  Past Surgical History:   Procedure Laterality Date     ABDOMINOPLASTY       [facility]     ARTHROSCOPY ANKLE Left 2016    Procedure: ARTHROSCOPY ANKLE;  Surgeon: Zac Cuello DPM;  Location: [facility]     C/SECTION, LOW TRANSVERSE  ,     , Low Transverse     CL AFF SURGICAL PATHOLOGY  ,     Breast reduction     D & C       DILATION AND CURETTAGE, HYSTEROSCOPY, ABLATE ENDOMETRIUM NOVASURE, COMBINED  2014    Procedure: COMBINED DILATION AND CURETTAGE, HYSTEROSCOPY, ABLATE ENDOMETRIUM NOVASURE;  Surgeon: Rachel Hernandez DO;  Location: [facility]     INCISION AND DRAINAGE LOWER EXTREMITY, COMBINED  2011    COMBINED INCISION AND DRAINAGE LOWER EXTREMITY performed by LEY, JEFFREY DUANE at [facility]     OPEN REDUCTION INTERNAL FIXATION ANKLE Left 2015    Procedure: OPEN REDUCTION INTERNAL FIXATION ANKLE;  Surgeon: Zac Cuello DPM;  Location: [facility]     REMOVE HARDWARE ANKLE  2011    REMOVE HARDWARE ANKLE performed by LEY, JEFFREY DUANE at [facility]     REMOVE HARDWARE ANKLE Left 2016    Procedure: REMOVE HARDWARE ANKLE;  Surgeon: Zac Cuello DPM;  Location: [facility]     SURGICAL HISTORY OF -       Right ankle fracture, ORIF     TUBAL LIGATION         MEDICATIONS:  Current Outpatient Prescriptions   Medication Sig Dispense Refill     metoprolol (LOPRESSOR) 25 MG tablet Take 1 tablet (25 mg) by mouth 2 times daily 180 tablet 3     amLODIPine (NORVASC) 2.5 MG tablet Take 1 tablet (2.5 mg) by mouth daily 90 tablet 1     Blood Pressure Monitoring (B-D ASSURE BPM/AUTO ARM CUFF) MISC 1 Device 3 times daily. (Patient not taking: Reported on 2018) 1 each 0       ALLERGIES:  Allergies   Allergen Reactions     Hydrochlorothiazide Rash      "Lisinopril      Hives       SOCIAL HISTORY:  Social History     Social History     Marital status:      Spouse name: N/A     Number of children: 2     Years of education: N/A     Occupational History     [employer]     Social History Main Topics     Smoking status: Passive Smoke Exposure - Never Smoker     Packs/day: 0.00     Years: 1.00     Types: Cigarettes     Start date: 9/10/1981     Last attempt to quit: 1/1/1983     Smokeless tobacco: Never Used     Alcohol use Yes      Comment: 2-3 times weekly     Drug use: No     Sexual activity: Yes     Partners: Male     Birth control/ protection: None      Comment: tubal ligation 1994     Other Topics Concern     Parent/Sibling W/ Cabg, Mi Or Angioplasty Before 65f 55m? No     Social History Narrative       FAMILY HISTORY:  Family History   Problem Relation Age of Onset     Breast Cancer Mother      dx age 38     DIABETES Sister      Cardiovascular Father      CHF     Respiratory Father      COPD     Cardiovascular Son      aortic stenosis     DIABETES Sister        REVIEW OF SYSTEMS:   ENT ROS 2/1/2018   Constitutional Weight gain, Unexplained fever or night sweats   Neurology Headache   Eyes Visual loss   Ears, Nose, Throat Ear pain, Sore throat   Cardiopulmonary Breathing problems   Gastrointestinal/Genitourinary Heartburn/indigestion         PHYSICAL EXAM  Ht 1.626 m (5' 4\")  Wt 82.6 kg (182 lb)  BMI 31.24 kg/m2    Constitutional: healthy, alert and no distress  ENT:   NOSE:  Septum deviated mildly to the left.  Turbinates normal  MOUTH:   MMM 3, tonsils 1+    DIAGNOSTIC PROCEDURES:  Fiberoptic Laryngoscopy is performed to examine the upper airway for pathology, functional or anatomic abnormality.  After application of topical anesthetic/decongestant solution the flexible endoscope was passed into each nasal cavity separately.  Findings are as follows:   Nasal passages appear clear bilaterally.  In the nasopharynx, there is no " obstructive adenoid tissue.  The patient has a normal base of tongue with no masses or lesions.  There is normal larynx with no vocal cord pathology.       ASSESSMENT:  1.  Severe obstructive sleep apnea,     2. Shortness of breath    PLAN:  The patient is here for consideration for hypoglossal nerve stimulation therapy.  I do not think the patient is a candidate for hypoglossal nerve stimulation therapy based off of the positional dependence of her sleep apnea.  Her supine AHI is greater than 65.  We have found that patients with supine AHIs greater than 65 do not do as well with the implant.  I do not think that she would also be a candidate for other surgical procedures.  I do think that positional therapy would be her best option.  I also have reassured the patient that I did not see any evidence of upper airway lesions or masses that might be causing her shortness of breath.       I spent 35 minutes face-to-face with Britt German during today's office visit, of which more than 50% was spent on counseling and coordination of care, which included discussion of pathophysiology of patient's obstructive sleep apnea, treatment options, risks and benefits of each option.          Again, thank you for allowing me to participate in the care of your patient.      Sincerely,    Rubi Cobb MD

## 2018-02-07 ENCOUNTER — TELEPHONE (OUTPATIENT)
Dept: FAMILY MEDICINE | Facility: CLINIC | Age: 53
End: 2018-02-07

## 2018-02-07 NOTE — TELEPHONE ENCOUNTER
I called the patient and left a voice mail. Asked that she call for a nurse visit can we follow up on Monday 2/12/2018 and call to remind her?

## 2018-03-19 ENCOUNTER — TELEPHONE (OUTPATIENT)
Dept: FAMILY MEDICINE | Facility: CLINIC | Age: 53
End: 2018-03-19

## 2018-03-19 ENCOUNTER — ALLIED HEALTH/NURSE VISIT (OUTPATIENT)
Dept: FAMILY MEDICINE | Facility: CLINIC | Age: 53
End: 2018-03-19
Payer: COMMERCIAL

## 2018-03-19 VITALS — HEART RATE: 67 BPM | SYSTOLIC BLOOD PRESSURE: 132 MMHG | DIASTOLIC BLOOD PRESSURE: 85 MMHG

## 2018-03-19 DIAGNOSIS — I10 HYPERTENSION GOAL BP (BLOOD PRESSURE) < 140/90: Primary | ICD-10-CM

## 2018-03-19 DIAGNOSIS — I10 BENIGN ESSENTIAL HYPERTENSION: ICD-10-CM

## 2018-03-19 PROCEDURE — 99207 ZZC NO CHARGE NURSE ONLY: CPT

## 2018-03-19 RX ORDER — METOPROLOL TARTRATE 25 MG/1
25 TABLET, FILM COATED ORAL DAILY
Qty: 30 TABLET | Refills: 1 | COMMUNITY
Start: 2018-03-19 | End: 2018-03-21 | Stop reason: ALTCHOICE

## 2018-03-19 RX ORDER — METOPROLOL TARTRATE 25 MG/1
25 TABLET, FILM COATED ORAL
Status: CANCELLED | OUTPATIENT
Start: 2018-03-19

## 2018-03-19 NOTE — NURSING NOTE
Pt presents to clinic for RN blood pressure recheck.  She was seen 12/6/17 and blood pressure was very elevated.    Today's readings are:  143/89, pulse of 75  Recheck 5 min later is 132/85, pulse of 67.    Pt is at goal.    However, pt reports that she reduced her metoprolol dose herself from 25 mg twice daily to 25 mg once daily.  Ok to continue this lower dose?  Routed to provider in a telephone note.  Please advise.  Una Verdugo RN      BP Readings from Last 6 Encounters:   03/19/18 132/85   12/21/17 148/90   12/14/17 131/84   12/06/17 (!) 185/110   10/06/16 150/90   02/04/16 138/85     Lab Results   Component Value Date    CR 0.67 03/16/2011     Lab Results   Component Value Date    POTASSIUM 4.3 03/16/2011

## 2018-03-19 NOTE — MR AVS SNAPSHOT
After Visit Summary   3/19/2018    Britt German    MRN: [id number]           Patient Information     Date Of Birth          1965        Visit Information        Provider Department      3/19/2018 10:45 AM ALEJANDRINA EISENBERG/CLARIBEL [facility]        Today's Diagnoses     Hypertension goal BP (blood pressure) < 140/90    -  1    Benign essential hypertension           Follow-ups after your visit        Who to contact     If you have questions or need follow up information about today's clinic visit or your schedule please contact [facility] directly at [phone].  Normal or non-critical lab and imaging results will be communicated to you by G-modehart, letter or phone within 4 business days after the clinic has received the results. If you do not hear from us within 7 days, please contact the clinic through G-modehart or phone. If you have a critical or abnormal lab result, we will notify you by phone as soon as possible.  Submit refill requests through [email or web address] or call your pharmacy and they will forward the refill request to us. Please allow 3 business days for your refill to be completed.          Additional Information About Your Visit        MyChart Information     [email or web address] gives you secure access to your electronic health record. If you see a primary care provider, you can also send messages to your care team and make appointments. If you have questions, please call your primary care clinic.  If you do not have a primary care provider, please call [phone] and they will assist you.        Care EveryWhere ID     This is your Care EveryWhere ID. This could be used by other organizations to access your [place] medical records  [id number]        Your Vitals Were     Pulse                   67            Blood Pressure from Last 3 Encounters:   03/19/18 132/85   12/21/17 148/90   12/14/17 131/84    Weight from Last 3 Encounters:   02/01/18 182 lb (82.6 kg)   12/21/17 183 lb  (83 kg)   12/14/17 183 lb (83 kg)              Today, you had the following     No orders found for display       Primary Care Provider Office Phone # Fax #    Tyesha Silva [phone] [address] [facility] [address]        Equal Access to Services     BRIAN CASTILLO : Hadii aad ku hadasho Soomaali, waaxda luqadaha, qaybta kaalmada adeegyada, waxay idiin hayaan adeeg khdeontesh ladelbert . So [facility] [phone].    ATENCIÓN: Si habla español, tiene a trimble disposición servicios gratuitos de asistencia lingüística. Maynor al [phone].    We comply with applicable federal civil rights laws and [place] laws. We do not discriminate on the basis of race, color, national origin, age, disability, sex, sexual orientation, or gender identity.            Thank you!     Thank you for choosing [facility]  for your care. Our goal is always to provide you with excellent care. Hearing back from our patients is one way we can continue to improve our services. Please take a few minutes to complete the written survey that you may receive in the mail after your visit with us. Thank you!             Your Updated Medication List - Protect others around you: Learn how to safely use, store and throw away your medicines at www.disposemymeds.org.          This list is accurate as of 3/19/18 11:46 AM.  Always use your most recent med list.                   Brand Name Dispense Instructions for use Diagnosis    amLODIPine 2.5 MG tablet    NORVASC    90 tablet    Take 1 tablet (2.5 mg) by mouth daily    Benign essential hypertension       metoprolol tartrate 25 MG tablet    LOPRESSOR    30 tablet    Take 1 tablet (25 mg) by mouth daily    Hypertension goal BP (blood pressure) < 140/90

## 2018-03-19 NOTE — TELEPHONE ENCOUNTER
Pt presents to clinic for RN blood pressure recheck.  She was seen 12/6/17 and blood pressure was very elevated.     Today's readings are:  143/89, pulse of 75  Recheck 5 min later is 132/85, pulse of 67.     Pt is at goal.     However, pt reports that she reduced her metoprolol dose herself from 25 mg twice daily to 25 mg once daily.  Ok to continue this lower dose?    Also, pt is due for lab work.  Not checked since 2011.  Labs were ordered in December during visit but pt has not completed them yet.  These need to be fasting.    Routed to provider in a telephone note.  Please advise.  Una Verdugo RN            BP Readings from Last 6 Encounters:   03/19/18 132/85   12/21/17 148/90   12/14/17 131/84   12/06/17 (!) 185/110   10/06/16 150/90   02/04/16 138/85            Lab Results   Component Value Date     CR 0.67 03/16/2011            Lab Results   Component Value Date     POTASSIUM 4.3 03/16/2011

## 2018-03-20 RX ORDER — METOPROLOL SUCCINATE 25 MG/1
25 TABLET, EXTENDED RELEASE ORAL DAILY
Qty: 90 TABLET | Refills: 1 | Status: SHIPPED | OUTPATIENT
Start: 2018-03-20 | End: 2019-03-29

## 2018-03-20 NOTE — TELEPHONE ENCOUNTER
Okay to continue with 25 mg daily. However the plain metoprolol is usually dosed twice a day so I will fax the XL to the pharmacy for her.

## 2018-07-30 ENCOUNTER — HEALTH MAINTENANCE LETTER (OUTPATIENT)
Age: 53
End: 2018-07-30

## 2018-09-12 ENCOUNTER — TELEPHONE (OUTPATIENT)
Dept: SLEEP MEDICINE | Facility: CLINIC | Age: 53
End: 2018-09-12

## 2018-09-12 NOTE — TELEPHONE ENCOUNTER
CALLED PATIENT TO DISCUSS GETTING SUPPLIES FROM [facility]. NEED SOME ADDITIONAL INFORMATION AND RX BEFORE WE CAN PROVIDE SUPPLIES. NO ANSWER, LEFT VM.

## 2018-10-09 ENCOUNTER — OFFICE VISIT (OUTPATIENT)
Dept: SLEEP MEDICINE | Facility: CLINIC | Age: 53
End: 2018-10-09
Payer: COMMERCIAL

## 2018-10-09 VITALS
DIASTOLIC BLOOD PRESSURE: 87 MMHG | BODY MASS INDEX: 30.39 KG/M2 | WEIGHT: 178 LBS | HEART RATE: 78 BPM | HEIGHT: 64 IN | SYSTOLIC BLOOD PRESSURE: 145 MMHG | OXYGEN SATURATION: 97 %

## 2018-10-09 DIAGNOSIS — G47.33 OSA (OBSTRUCTIVE SLEEP APNEA): Primary | ICD-10-CM

## 2018-10-09 PROCEDURE — 99214 OFFICE O/P EST MOD 30 MIN: CPT | Performed by: FAMILY MEDICINE

## 2018-10-09 NOTE — PATIENT INSTRUCTIONS

## 2018-10-09 NOTE — NURSING NOTE
"Chief Complaint   Patient presents with     CPAP Follow Up     Yearly follow up for sleep apnea. looking for a new machine       Initial /87  Pulse 78  Ht 1.626 m (5' 4.02\")  Wt 80.7 kg (178 lb)  SpO2 97%  BMI 30.54 kg/m2 Estimated body mass index is 30.54 kg/(m^2) as calculated from the following:    Height as of this encounter: 1.626 m (5' 4.02\").    Weight as of this encounter: 80.7 kg (178 lb).    Medication Reconciliation: complete      "

## 2018-10-09 NOTE — MR AVS SNAPSHOT
After Visit Summary   10/9/2018    Britt German    MRN: [id number]           Patient Information     Date Of Birth          1965        Visit Information        Provider Department      10/9/2018 2:30 PM Niraj Lopez MD [facility]        Today's Diagnoses     LULÚ (obstructive sleep apnea)    -  1      Care Instructions      Your BMI is Body mass index is 30.54 kg/(m^2).  Weight management is a personal decision.  If you are interested in exploring weight loss strategies, the following discussion covers the approaches that may be successful. Body mass index (BMI) is one way to tell whether you are at a healthy weight, overweight, or obese. It measures your weight in relation to your height.  A BMI of 18.5 to 24.9 is in the healthy range. A person with a BMI of 25 to 29.9 is considered overweight, and someone with a BMI of 30 or greater is considered obese. More than two-thirds of American adults are considered overweight or obese.  Being overweight or obese increases the risk for further weight gain. Excess weight may lead to heart disease and diabetes.  Creating and following plans for healthy eating and physical activity may help you improve your health.  Weight control is part of healthy lifestyle and includes exercise, emotional health, and healthy eating habits. Careful eating habits lifelong are the mainstay of weight control. Though there are significant health benefits from weight loss, long-term weight loss with diet alone may be very difficult to achieve- studies show long-term success with dietary management in less than 10% of people. Attaining a healthy weight may be especially difficult to achieve in those with severe obesity. In some cases, medications, devices and surgical management might be considered.  What can you do?  If you are overweight or obese and are interested in methods for weight loss, you should discuss this with your provider.      Consider reducing daily calorie intake by 500 calories.     Keep a food journal.     Avoiding skipping meals, consider cutting portions instead.    Diet combined with exercise helps maintain muscle while optimizing fat loss. Strength training is particularly important for building and maintaining muscle mass. Exercise helps reduce stress, increase energy, and improves fitness. Increasing exercise without diet control, however, may not burn enough calories to loose weight.       Start walking three days a week 10-20 minutes at a time    Work towards walking thirty minutes five days a week     Eventually, increase the speed of your walking for 1-2 minutes at time    In addition, we recommend that you review healthy lifestyles and methods for weight loss available through the National Institutes of Health patient information sites:  http://win.niddk.nih.gov/publications/index.htm    And look into health and wellness programs that may be available through your health insurance provider, employer, local community center, or lamar club.    Weight management plan: Patient was referred to their PCP to discuss a diet and exercise plan.        Your Body mass index is 30.54 kg/(m^2).  Weight management is a personal decision.  If you are interested in exploring weight loss strategies, the following discussion covers the approaches that may be successful. Body mass index (BMI) is one way to tell whether you are at a healthy weight, overweight, or obese. It measures your weight in relation to your height.  A BMI of 18.5 to 24.9 is in the healthy range. A person with a BMI of 25 to 29.9 is considered overweight, and someone with a BMI of 30 or greater is considered obese. More than two-thirds of American adults are considered overweight or obese.  Being overweight or obese increases the risk for further weight gain. Excess weight may lead to heart disease and diabetes.  Creating and following plans for healthy eating and  physical activity may help you improve your health.  Weight control is part of healthy lifestyle and includes exercise, emotional health, and healthy eating habits. Careful eating habits lifelong are the mainstay of weight control. Though there are significant health benefits from weight loss, long-term weight loss with diet alone may be very difficult to achieve- studies show long-term success with dietary management in less than 10% of people. Attaining a healthy weight may be especially difficult to achieve in those with severe obesity. In some cases, medications, devices and surgical management might be considered.  What can you do?  If you are overweight or obese and are interested in methods for weight loss, you should discuss this with your provider.     Consider reducing daily calorie intake by 500 calories.     Keep a food journal.     Avoiding skipping meals, consider cutting portions instead.    Diet combined with exercise helps maintain muscle while optimizing fat loss. Strength training is particularly important for building and maintaining muscle mass. Exercise helps reduce stress, increase energy, and improves fitness. Increasing exercise without diet control, however, may not burn enough calories to loose weight.       Start walking three days a week 10-20 minutes at a time    Work towards walking thirty minutes five days a week     Eventually, increase the speed of your walking for 1-2 minutes at time    In addition, we recommend that you review healthy lifestyles and methods for weight loss available through the National Institutes of Health patient information sites:  http://win.niddk.nih.gov/publications/index.htm    And look into health and wellness programs that may be available through your health insurance provider, employer, local community center, or lamar club.                  Follow-ups after your visit        Follow-up notes from your care team     Return in about 4 weeks (around  "11/6/2018), or if symptoms worsen or fail to improve.      Your next 10 appointments already scheduled     Oct 18, 2018  8:30 AM CDT   PAP SETUP REPLACEMENT with [facility] DME   [facility]    [address]  [facility] [address]   [phone]              Who to contact     If you have questions or need follow up information about today's clinic visit or your schedule please contact [facility] directly at [phone].  Normal or non-critical lab and imaging results will be communicated to you by docTrackrhart, letter or phone within 4 business days after the clinic has received the results. If you do not hear from us within 7 days, please contact the clinic through [employer] or phone. If you have a critical or abnormal lab result, we will notify you by phone as soon as possible.  Submit refill requests through "[employer]" or call your pharmacy and they will forward the refill request to us. Please allow 3 business days for your refill to be completed.          Additional Information About Your Visit        docTrackrhart Information     "[employer]" gives you secure access to your electronic health record. If you see a primary care provider, you can also send messages to your care team and make appointments. If you have questions, please call your primary care clinic.  If you do not have a primary care provider, please call [phone] and they will assist you.        Care EveryWhere ID     This is your Care EveryWhere ID. This could be used by other organizations to access your [facility] medical records  [id number]        Your Vitals Were     Pulse Height Pulse Oximetry BMI (Body Mass Index)          78 1.626 m (5' 4.02\") 97% 30.54 kg/m2         Blood Pressure from Last 3 Encounters:   10/09/18 145/87   03/19/18 132/85   12/21/17 148/90    Weight from Last 3 Encounters:   10/09/18 80.7 kg (178 lb)   02/01/18 82.6 kg (182 lb)   12/21/17 83 kg (183 lb)         "      We Performed the Following     Sleep Comprehensive DME        Primary Care Provider Office Phone # Fax #    Tyesha Silva [phone] [address] [facility] [address]        Equal Access to Services     BRIAN CASTILLO : Hadii aad ku hadmemoo Soomaali, waaxda luqadaha, qaybta kaalmada adejcyada, zion bethanyin hayaaanirudh ball walter deepa villarreal. So [facility] [phone].    ATENCIÓN: Si habla español, tiene a trimble disposición servicios gratuitos de asistencia lingüística. Llame al [phone].    We comply with applicable federal civil rights laws and [place] laws. We do not discriminate on the basis of race, color, national origin, age, disability, sex, sexual orientation, or gender identity.            Thank you!     Thank you for choosing [facility]  for your care. Our goal is always to provide you with excellent care. Hearing back from our patients is one way we can continue to improve our services. Please take a few minutes to complete the written survey that you may receive in the mail after your visit with us. Thank you!             Your Updated Medication List - Protect others around you: Learn how to safely use, store and throw away your medicines at www.disposemymeds.org.          This list is accurate as of 10/9/18 11:59 PM.  Always use your most recent med list.                   Brand Name Dispense Instructions for use Diagnosis    amLODIPine 2.5 MG tablet    NORVASC    90 tablet    Take 1 tablet (2.5 mg) by mouth daily    Benign essential hypertension       metoprolol succinate 25 MG 24 hr tablet    TOPROL-XL    90 tablet    Take 1 tablet (25 mg) by mouth daily    Hypertension goal BP (blood pressure) < 140/90

## 2018-10-09 NOTE — PROGRESS NOTES
Obstructive Sleep Apnea - PAP Follow-Up Visit:    Chief Complaint   Patient presents with     CPAP Follow Up     Yearly follow up for sleep apnea. looking for a new machine       Britt German comes in today for follow-up of their severe obstructive sleep apnea.     Pertinent PMHx of presumed benign pulmonary nodules, HTN, hyperlipidemia.    Last visit on 2017 to discuss treatment options for LULÚ given difficulty tolerating CPAP.  She inquired about hypoglossal nerve stimulation and she did fit basic criteria, plan to repeat diagnostic PSG.  PSG showed severe LULÚ with strong positional component.  Discussed positional therapy and placed referral to Dr. Cobb.  She did meet with Dr. Cobb on 2018, but not felt to be good candidate due to the elevated supine AHI and agreed with trial of positional therapy.    She returns today due to not finding the positional device very comfortable to use (causing shoulder pain) and wants to retry CPAP.    Prior Sleep Testin2018 - PSG with weight 183 lbs, AHI 46.7, RDI 48.5, baseline SpO2 94.7%, angel SpO2 75.2%, time of SpO2 <= 88% of 5.7 minutes.  Strong positional component with supine .1, lateral AHI 4.7.  2013 - PSG with weight 168 lbs, AHI 43, angel SpO2 85%, CPAP   10 effective.    Problem List:  Patient Active Problem List    Diagnosis Date Noted     Hypertension goal BP (blood pressure) < 140/90 2013     Priority: High     LULÚ (obstructive sleep apnea) 2013     Priority: Medium     Severe LULÚ (2013 with AHI 43, angel desat 85%, CPAP 10 effective and included supine REM)       Indeterminate pulmonary nodules 2013     Priority: Medium     CT angio chest showing  (13):   Scattered small indeterminate pulmonary nodules in both lower lungs, with the largest measuring 0.5 cm on the left. Followup CT in 6-12 months is recommended ( 10/9/2013- 2013) .             Hyperlipidemia with target LDL less than 160 10/31/2010  "    Priority: Medium     Diagnosis updated by automated process. Provider to review and confirm.       Family history of breast cancer in mother 05/26/2009     Priority: Medium     Yearly MMG       Recurrent UTI      Priority: Low     Tubal ligation status 05/26/2009     Priority: Low          /87  Pulse 78  Ht 1.626 m (5' 4.02\")  Wt 80.7 kg (178 lb)  SpO2 97%  BMI 30.54 kg/m2    Impression/Plan:    1.)  Severe LULÚ with mild sleep-associated hypoxemia.   - Unable to tolerate positional therapy, oral appliance.  Not felt to be strong surgical candidate or for UAS given strong positional component.   - Will restart CPAP auto-titrate 5-15 and consider additional of a sedative hypnotic to aid compliance.       Britt German will follow up in about 1 month(s).     Twenty-five minutes spent with patient, all of which were spent face-to-face counseling, consulting, coordinating plan of care.      Niraj Lopez MD, MD    CC:  Tyesha Silva  "

## 2018-10-18 ENCOUNTER — DOCUMENTATION ONLY (OUTPATIENT)
Dept: SLEEP MEDICINE | Facility: CLINIC | Age: 53
End: 2018-10-18
Payer: COMMERCIAL

## 2018-10-18 NOTE — PROGRESS NOTES
Patient was offered choice of vendor and chose [facility].  Patient Britt German was set up at [facility]  on October 18, 2018. Patient received a Resmed AirSense 10 Auto. Pressures were set at 5-15 cm H2O.   Patient s ramp is 5 cm H2O for Auto and FLEX/EPR is 2.  Patient received a Sapna Respironics Mask name: DREAMWEAR  Nasal mask Size Small, heated tubing and heated humidifier.  Patient is not enrolled in the STM Program and does need to meet compliance. Patient has a follow up on 12/6/18 with Dr. Lopez.    Fina Bass

## 2018-12-06 ENCOUNTER — OFFICE VISIT (OUTPATIENT)
Dept: SLEEP MEDICINE | Facility: CLINIC | Age: 53
End: 2018-12-06
Payer: COMMERCIAL

## 2018-12-06 VITALS — DIASTOLIC BLOOD PRESSURE: 95 MMHG | SYSTOLIC BLOOD PRESSURE: 160 MMHG | OXYGEN SATURATION: 91 % | HEART RATE: 70 BPM

## 2018-12-06 DIAGNOSIS — G47.33 OSA (OBSTRUCTIVE SLEEP APNEA): Primary | ICD-10-CM

## 2018-12-06 PROCEDURE — 99214 OFFICE O/P EST MOD 30 MIN: CPT | Performed by: FAMILY MEDICINE

## 2018-12-06 NOTE — MR AVS SNAPSHOT
After Visit Summary   12/6/2018    Britt German    MRN: [id number]           Patient Information     Date Of Birth          1965        Visit Information        Provider Department      12/6/2018 8:00 AM Niraj Lopez MD [facility]        Today's Diagnoses     LULÚ (obstructive sleep apnea)    -  1      Care Instructions      Your BMI is There is no height or weight on file to calculate BMI.  Weight management is a personal decision.  If you are interested in exploring weight loss strategies, the following discussion covers the approaches that may be successful. Body mass index (BMI) is one way to tell whether you are at a healthy weight, overweight, or obese. It measures your weight in relation to your height.  A BMI of 18.5 to 24.9 is in the healthy range. A person with a BMI of 25 to 29.9 is considered overweight, and someone with a BMI of 30 or greater is considered obese. More than two-thirds of American adults are considered overweight or obese.  Being overweight or obese increases the risk for further weight gain. Excess weight may lead to heart disease and diabetes.  Creating and following plans for healthy eating and physical activity may help you improve your health.  Weight control is part of healthy lifestyle and includes exercise, emotional health, and healthy eating habits. Careful eating habits lifelong are the mainstay of weight control. Though there are significant health benefits from weight loss, long-term weight loss with diet alone may be very difficult to achieve- studies show long-term success with dietary management in less than 10% of people. Attaining a healthy weight may be especially difficult to achieve in those with severe obesity. In some cases, medications, devices and surgical management might be considered.  What can you do?  If you are overweight or obese and are interested in methods for weight loss, you should discuss this with  your provider.     Consider reducing daily calorie intake by 500 calories.     Keep a food journal.     Avoiding skipping meals, consider cutting portions instead.    Diet combined with exercise helps maintain muscle while optimizing fat loss. Strength training is particularly important for building and maintaining muscle mass. Exercise helps reduce stress, increase energy, and improves fitness. Increasing exercise without diet control, however, may not burn enough calories to loose weight.       Start walking three days a week 10-20 minutes at a time    Work towards walking thirty minutes five days a week     Eventually, increase the speed of your walking for 1-2 minutes at time    In addition, we recommend that you review healthy lifestyles and methods for weight loss available through the National Institutes of Health patient information sites:  http://win.niddk.nih.gov/publications/index.htm    And look into health and wellness programs that may be available through your health insurance provider, employer, local community center, or lamar club.             Your There is no height or weight on file to calculate BMI.  Weight management is a personal decision.  If you are interested in exploring weight loss strategies, the following discussion covers the approaches that may be successful. Body mass index (BMI) is one way to tell whether you are at a healthy weight, overweight, or obese. It measures your weight in relation to your height.  A BMI of 18.5 to 24.9 is in the healthy range. A person with a BMI of 25 to 29.9 is considered overweight, and someone with a BMI of 30 or greater is considered obese. More than two-thirds of American adults are considered overweight or obese.  Being overweight or obese increases the risk for further weight gain. Excess weight may lead to heart disease and diabetes.  Creating and following plans for healthy eating and physical activity may help you improve your health.  Weight  control is part of healthy lifestyle and includes exercise, emotional health, and healthy eating habits. Careful eating habits lifelong are the mainstay of weight control. Though there are significant health benefits from weight loss, long-term weight loss with diet alone may be very difficult to achieve- studies show long-term success with dietary management in less than 10% of people. Attaining a healthy weight may be especially difficult to achieve in those with severe obesity. In some cases, medications, devices and surgical management might be considered.  What can you do?  If you are overweight or obese and are interested in methods for weight loss, you should discuss this with your provider.     Consider reducing daily calorie intake by 500 calories.     Keep a food journal.     Avoiding skipping meals, consider cutting portions instead.    Diet combined with exercise helps maintain muscle while optimizing fat loss. Strength training is particularly important for building and maintaining muscle mass. Exercise helps reduce stress, increase energy, and improves fitness. Increasing exercise without diet control, however, may not burn enough calories to loose weight.       Start walking three days a week 10-20 minutes at a time    Work towards walking thirty minutes five days a week     Eventually, increase the speed of your walking for 1-2 minutes at time    In addition, we recommend that you review healthy lifestyles and methods for weight loss available through the National Institutes of Health patient information sites:  http://win.niddk.nih.gov/publications/index.htm    And look into health and wellness programs that may be available through your health insurance provider, employer, local community center, or lamar club.    Weight management plan: Patient was referred to their PCP to discuss a diet and exercise plan.          Follow-ups after your visit        Follow-up notes from your care team     Return  in 1 year (on 12/6/2019) for PAP follow up.      Who to contact     If you have questions or need follow up information about today's clinic visit or your schedule please contact [facility] directly at [phone].  Normal or non-critical lab and imaging results will be communicated to you by MyChart, letter or phone within 4 business days after the clinic has received the results. If you do not hear from us within 7 days, please contact the clinic through MyChart or phone. If you have a critical or abnormal lab result, we will notify you by phone as soon as possible.  Submit refill requests through [employer] or call your pharmacy and they will forward the refill request to us. Please allow 3 business days for your refill to be completed.          Additional Information About Your Visit        [employer] Information     [employer] gives you secure access to your electronic health record. If you see a primary care provider, you can also send messages to your care team and make appointments. If you have questions, please call your primary care clinic.  If you do not have a primary care provider, please call [phone] and they will assist you.        Care EveryWhere ID     This is your Care EveryWhere ID. This could be used by other organizations to access your [facility] medical records  [id number]        Your Vitals Were     Pulse Pulse Oximetry                70 91%           Blood Pressure from Last 3 Encounters:   12/06/18 (!) 160/95   10/09/18 145/87   03/19/18 132/85    Weight from Last 3 Encounters:   10/09/18 80.7 kg (178 lb)   02/01/18 82.6 kg (182 lb)   12/21/17 83 kg (183 lb)              We Performed the Following     Comprehensive DME        Primary Care Provider Office Phone # Fax #    Tyesha Silva [phone] [address] [address]        Equal Access to Services     BRIAN CASTILLO AH: aminata Polk qaybta  zion andersonjc arenas ah. [facility] [phone].    ATENCIÓN: Si luke edge, tiene a trimble disposición servicios gratuitos de asistencia lingüística. Maynor al [phone].    We comply with applicable federal civil rights laws and [place] laws. We do not discriminate on the basis of race, color, national origin, age, disability, sex, sexual orientation, or gender identity.            Thank you!     Thank you for choosing [facility]  for your care. Our goal is always to provide you with excellent care. Hearing back from our patients is one way we can continue to improve our services. Please take a few minutes to complete the written survey that you may receive in the mail after your visit with us. Thank you!             Your Updated Medication List - Protect others around you: Learn how to safely use, store and throw away your medicines at www.disposemymeds.org.          This list is accurate as of 12/6/18  9:33 AM.  Always use your most recent med list.                   Brand Name Dispense Instructions for use Diagnosis    amLODIPine 2.5 MG tablet    NORVASC    90 tablet    Take 1 tablet (2.5 mg) by mouth daily    Benign essential hypertension       metoprolol succinate ER 25 MG 24 hr tablet    TOPROL-XL    90 tablet    Take 1 tablet (25 mg) by mouth daily    Hypertension goal BP (blood pressure) < 140/90

## 2018-12-06 NOTE — PATIENT INSTRUCTIONS

## 2018-12-06 NOTE — PROGRESS NOTES
Obstructive Sleep Apnea - PAP Follow-Up Visit:    Chief Complaint   Patient presents with     CPAP Follow Up     The whole machine and mask is a bit uncomfortable. Has been using every night per patient.       Britt German comes in today for follow-up of their severe obstructive sleep apnea, managed with CPAP.    2017 - F/U to discuss treatment options for LULÚ given difficulty tolerating CPAP.  She inquired about hypoglossal nerve stimulation and she did fit basic criteria, plan to repeat diagnostic PSG.  PSG showed severe LULÚ with strong positional component.  Discussed positional therapy and placed referral to Dr. Cobb.    2018 - Consult with Dr. Cobb.  Not felt to be good candidate due to the elevated supine AHI and agreed with trial of positional therapy.    10/9/2018 - She returns today due to not finding the positional device very comfortable to use (causing shoulder pain) and wants to retry CPAP.  A/P for CPAP auto-titrate 5-15 cm H2O.    Today - Returns for CPAP compliance follow-up.  She has done surprisingly well with her CPAP, has used it every night.  She thinks she is sleeping better, but one of her main goals is to reduce cardiovascular risk factors and improve BP.  She does find the mask can be some uncomfortable (dreamwear nasal) and times of feeling excessive pressure.  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    CPAP download from 10/29/2018 - 2018 on auto-titrate 5-15 cm H2O.  Used on 30/30 days, 93% of nights >= 4 hours.  Average daily usage of 6 hours 19 minutes.  Pressure median 9.2 cm H2O, 95th%ile of 11.4 cm H2O.  AHI 1.3.    Prior Sleep Testin2018 - PSG with weight 183 lbs, AHI 46.7, RDI 48.5, baseline SpO2 94.7%, angel SpO2 75.2%, time of SpO2 <= 88% of 5.7 minutes.  Strong positional component with supine .1, lateral AHI 4.7.  2013 - PSG with  weight 168 lbs, AHI 43, angel SpO2 85%, CPAP   10 effective.      Problem List:  Patient Active Problem List    Diagnosis Date Noted     Hypertension goal BP (blood pressure) < 140/90 03/12/2013     Priority: High     LULÚ (obstructive sleep apnea) 05/23/2013     Priority: Medium     Severe LULÚ (5/21/2013 with AHI 43, angel desat 85%, CPAP 10 effective and included supine REM)       Indeterminate pulmonary nodules 04/09/2013     Priority: Medium     CT angio chest showing  (4/9/13):   Scattered small indeterminate pulmonary nodules in both lower lungs, with the largest measuring 0.5 cm on the left. Followup CT in 6-12 months is recommended ( 10/9/2013- 4/9/2013) .             Hyperlipidemia with target LDL less than 160 10/31/2010     Priority: Medium     Diagnosis updated by automated process. Provider to review and confirm.       Family history of breast cancer in mother 05/26/2009     Priority: Medium     Yearly MMG       Recurrent UTI      Priority: Low     Tubal ligation status 05/26/2009     Priority: Low          BP (!) 160/95  Pulse 70  SpO2 91%    Impression/Plan:    1.)  Severe LULÚ with mild sleep-associated hypoxemia.   - Unable to tolerate positional therapy, oral appliance.  Not felt to be strong surgical candidate or for UAS given strong positional component.   - Doing surprisingly well with CPAP, but some feeling of excessive pressure.  Continue CPAP, will change maximum EPAP to 10 cm H2O (now 5-10).    Britt German will follow up in about 1 year(s).     Twenty-five minutes spent with patient, all of which were spent face-to-face counseling, consulting, coordinating plan of care.      Niraj Lopez MD, MD    CC:  Tyesha Silva

## 2019-01-24 ENCOUNTER — TELEPHONE (OUTPATIENT)
Dept: FAMILY MEDICINE | Facility: CLINIC | Age: 54
End: 2019-01-24

## 2019-01-24 DIAGNOSIS — I10 BENIGN ESSENTIAL HYPERTENSION: ICD-10-CM

## 2019-01-24 DIAGNOSIS — I10 SEVERE HYPERTENSION: ICD-10-CM

## 2019-01-25 NOTE — TELEPHONE ENCOUNTER
Left message for patient to return call to clinic.  Patient is due for OV - LOV 12/2017 and her bp was elevated.  Kaylie ENGLISH RN

## 2019-01-25 NOTE — TELEPHONE ENCOUNTER
"Requested Prescriptions   Pending Prescriptions Disp Refills     amLODIPine (NORVASC) 5 MG tablet [Pharmacy Med Name: AMLODIPINE BESYLATE 5MG TABS] 90 tablet 3     Sig: TAKE ONE TABLET BY MOUTH EVERY DAY    Calcium Channel Blockers Protocol  Failed - 1/24/2019  2:47 PM       Failed - Blood pressure under 140/90 in past 12 months    BP Readings from Last 3 Encounters:   12/06/18 (!) 160/95   10/09/18 145/87   03/19/18 132/85                Failed - Recent (12 mo) or future (30 days) visit within the authorizing provider's specialty    Patient had office visit in the last 12 months or has a visit in the next 30 days with authorizing provider or within the authorizing provider's specialty.  See \"Patient Info\" tab in inbasket, or \"Choose Columns\" in Meds & Orders section of the refill encounter.             Failed - Normal serum creatinine on file in past 12 months    Recent Labs   Lab Test 03/16/11  0825   CR 0.67            Passed - Medication is active on med list       Passed - Patient is age 18 or older       Passed - No active pregnancy on record       Passed - No positive pregnancy test in past 12 months          "

## 2019-01-28 RX ORDER — AMLODIPINE BESYLATE 5 MG/1
TABLET ORAL
Qty: 30 TABLET | Refills: 0 | Status: SHIPPED | OUTPATIENT
Start: 2019-01-28 | End: 2019-03-29

## 2019-01-28 NOTE — TELEPHONE ENCOUNTER
Patient contacted and told of the need for a office visit for further refills. Ermelinda ZMIMERMAN RN

## 2019-02-04 NOTE — TELEPHONE ENCOUNTER
Thoracic Surgery H&P     Antonio Enamorado MD  [id number]  1938    Date: 2/4/2019    PMD: Jose Manuel Gonzalez MD    Pulmonologist: Dr. Perry / Dr. Gutiérrez    Requesting Physician: Dr. Varela    Reason for Visit: Right pleural effusion     Chief Complaint: shortness of breath    Pertinent Medical History     This is a 80 year old male patient with past medical history significant for syncope, GERD, hypertension, recurrent bronchitis, lumbar spinal stenosis, idiopathic polyneuropathy and right pleural effusions. He presented to the emergency department on 2/1/19 with complaints of new onset chest pain, exertional dyspnea and generalized weakness. He also noted non-productive cough 1 month for which he underwent chest xray revealing progressive infiltrates involving the right mid and right lower lung with associated effusion. He was diagnosed with bronchitis and given burst steriods. This initially improved his breathing but it again worsened early last week. He was directly admitted for evaluation. Work up was remarkable for BNP 2509, Procalcitonin 2.95, WBC 20.8. Chest xray revealed extensive infiltrate involving R lung, consistent with R middle lobe pneumonia, as well as new small R sided pleural effusion and R infrahilar/basilar atelectasis, when compared with recent prior imaging. He underwent thoracentesis 2/3/2019 removing 280 cc of 280 fluid. Cultures positive for MRSA. CT chest was completed demonstrating increased right pleural effusion and consolidation of right lower and middle lobes. Consultation with thoracic surgery is requested.     Past Medical & Surgical History     Past Medical History:   Diagnosis Date   • Gastroesophageal reflux disease    • MGUS (monoclonal gammopathy of unknown significance) 9/15/2016    Neuropathy associated with MGUS   • Syncope                                              Past Surgical History:   Procedure Laterality Date   • Hernia repair  2011   • Service to  Left message for patient to return a call to the clinic RN.   THANIA Verdugo RN     gastroenterology  2012    colonoscopy   • Tonsillectomy and adenoidectomy         Medications and Allergies     ALLERGIES:  No Known Allergies    Current Facility-Administered Medications   Medication Dose Route Frequency Provider Last Rate Last Dose   • [START ON 2/5/2019] enoxaparin (LOVENOX) injection 40 mg  40 mg Subcutaneous Daily Antoni Varela MD       • [START ON 2/5/2019] cholecalciferol (VITAMIN D3) tablet 2,000 Units  2,000 Units Oral Daily DOLORES Vega       • guaiFENesin-DM (MUCINEX DM) 600-30 mg ER tablet 2 tablet  2 tablet Oral 2 times per day Jim Vaughn MD   2 tablet at 02/04/19 0838   • albuterol-ipratropium 2.5 mg/0.5 mg (DUONEB) nebulizer solution 3 mL  3 mL Nebulization Q6H Resp PRN Jim Vaughn MD   3 mL at 02/03/19 1532   • polyethylene glycol (GLYCOLAX, MIRALAX) packet 34 g  34 g Oral BID Jim Vaughn MD   34 g at 02/04/19 0838   • albuterol-ipratropium 2.5 mg/0.5 mg (DUONEB) nebulizer solution 3 mL  3 mL Nebulization TID Resp Antoni Varela MD   3 mL at 02/04/19 1550   • traMADol (ULTRAM) tablet 50 mg  50 mg Oral Q6H PRN Jim Vaughn MD   50 mg at 02/03/19 2113   • acetaminophen (TYLENOL) tablet 650 mg  650 mg Oral Q4H PRN Jim Vaughn MD       • azithromycin (ZITHROMAX) 500 mg in sodium chloride 0.9 % 250 mL IVPB  500 mg Intravenous Q Evening Pema Painting  mL/hr at 02/03/19 1635 500 mg at 02/03/19 1635   • ceFEPIme (MAXIPIME) 1,000 mg in sodium chloride 0.9 % 100 mL IVPB  1,000 mg Intravenous 2 times per day Pema Painting MD 25 mL/hr at 02/04/19 0839 1,000 mg at 02/04/19 0839   • VANCOMYCIN - PHARMACIST MONITORED   Does not apply See Admin Instructions Pema Painting MD       • lactobacillus acidophilus (BACID) tablet 2 tablet  2 tablet Oral Daily Pema Painting MD   2 tablet at 02/04/19 0838   • vancomycin 1,250 mg in sodium chloride 0.9% 250 mL IVPB  1,250 mg Intravenous Daily Pema Painting  mL/hr at 02/04/19 1257 1,250 mg at 02/04/19  1257   • calcium carbonate (TUMS) chewable tablet 500 mg  500 mg Oral Q4H PRN Deric Rodriguez MD   500 mg at 02/02/19 2007   • sodium chloride (PF) 0.9 % injection 2 mL  2 mL Injection 2 times per day Lucy Harper NP   2 mL at 02/04/19 0843   • sodium chloride (PF) 0.9 % injection 2 mL  2 mL Injection PRN Lucy Harper NP   2 mL at 02/01/19 2007   • sodium chloride 0.9 % flush bag 500 mL  500 mL Intravenous PRN Lucy Harper NP   500 mL at 02/03/19 0502   • potassium chloride ER tablet 20 mEq  20 mEq Oral Q4H PRN Lucy Harper NP       • potassium chloride (KLOR-CON) packet 20 mEq  20 mEq Per NG tube Q4H PRN Lucy Harper NP       • potassium chloride 20 mEq/100mL IVPB premix  20 mEq Intravenous Q4H PRN Lucy Harper NP       • potassium chloride ER tablet 40 mEq  40 mEq Oral Q4H PRN Lucy Harper NP       • potassium chloride (KLOR-CON) packet 40 mEq  40 mEq Per NG tube Q4H PRN Lucy Harper NP       • potassium chloride 20 mEq/100mL IVPB premix  40 mEq Intravenous Q4H PRSCOOTER Harper NP       • magnesium sulfate 1 g in dextrose 5% 100 mL IVPB premix  1 g Intravenous Daily PRN Lucy Harper NP       • magnesium sulfate 2 g in 50 mL premix IVPB  2 g Intravenous Daily PRN Lucy Harper NP       • magnesium sulfate 2 g in 50 mL premix IVPB  2 g Intravenous Q4H PRN Lucy Harper NP       • ondansetron (ZOFRAN) injection 4 mg  4 mg Intravenous BID PRN Lucy Harper NP       • prochlorperazine (COMPAZINE) tablet 5 mg  5 mg Oral Q4H PRN Lucy Harper NP       • prochlorperazine (COMPAZINE) injection 5 mg  5 mg Intravenous Q4H PRN Lucy Harper NP       • polyethylene glycol (GLYCOLAX, MIRALAX) packet 17 g  17 g Oral Daily PRN Lucy Harper NP   17 g at 02/02/19 1241   • docusate sodium-sennosides (SENOKOT S) 50-8.6 MG 2 tablet  2 tablet Oral Daily PRN Lucy Harper NP   2 tablet at  02/02/19 1631   • bisacodyl (DULCOLAX) suppository 10 mg  10 mg Rectal Daily PRN Lucy Harper NP       • magnesium hydroxide (MILK OF MAGNESIA) concentrate 2,400 mg  10 mL Oral Daily PRN Lucy Harper, NP       • aluminum-magnesium hydroxide-simethicone (MAALOX) 200-200-20 MG/5ML suspension 30 mL  30 mL Oral Q4H PRN Lucy Harper NP       • amLODIPine (NORVASC) tablet 5 mg  5 mg Oral Nightly Lucy  Leroy, NP   5 mg at 02/03/19 2113   • aspirin chewable 81 mg  81 mg Oral Nightly Lucy  Leroy, NP   81 mg at 02/03/19 2113   • metoPROLOL succinate (TOPROL-XL) ER tablet 25 mg  25 mg Oral Nightly Lucy  Leroy NP   25 mg at 02/03/19 2113   • pantoprazole (PROTONIX) EC tablet 40 mg  40 mg Oral QAM AC Lucy Harper NP   40 mg at 02/04/19 0838   • tamsulosin (FLOMAX) capsule 0.4 mg  0.4 mg Oral Nightly Lucycarolina Harper NP   0.4 mg at 02/03/19 2113   • metroNIDAZOLE (FLAGYL) IVPB 500 mg  500 mg Intravenous 3 times per day Antoni Varela  mL/hr at 02/04/19 1113 500 mg at 02/04/19 1113   • sodium chloride (PF) 0.9 % injection 10 mL  10 mL Injection 3 times per day Antoni Varela MD   10 mL at 02/04/19 1258   • sodium chloride (PF) 0.9 % injection 10 mL  10 mL Injection PRN Antoni Varela MD       • sodium chloride (PF) 0.9 % injection 10 mL  10 mL Injection PRN Antoni Varela MD       • sodium chloride (PF) 0.9 % injection 20 mL  20 mL Injection PRN Antoni Varela MD           Family & Social History     Social History     Socioeconomic History   • Marital status:      Spouse name: Not on file   • Number of children: Not on file   • Years of education: Not on file   • Highest education level: Not on file   Social Needs   • Financial resource strain: Not on file   • Food insecurity - worry: Not on file   • Food insecurity - inability: Not on file   • Transportation needs - medical: Not on file   • Transportation needs - non-medical: Not on file   Occupational  History   • Not on file   Tobacco Use   • Smoking status: Never Smoker   • Smokeless tobacco: Never Used   Substance and Sexual Activity   • Alcohol use: Yes     Comment: glass of wine   • Drug use: No   • Sexual activity: Not on file   Other Topics Concern   • Not on file   Social History Narrative   • Not on file       Alcohol Use: Yes                Comment: glass of wine      History   Drug Use No       Family History   Problem Relation Age of Onset   • Heart disease Mother        Review of Systems     A 10 point ROS was completed, outlined in HPI.     Physical Exam     Visit Vitals  /84 (BP Location: [facility] [place], Patient Position: Sitting)   Pulse 119   Temp 98.2 °F (36.8 °C) (Oral)   Resp 16   Ht 6' (1.829 m)   Wt 70 kg   SpO2 95%   BMI 20.93 kg/m²       Body mass index is 20.93 kg/m².    General: male, NAD, well developed  Eyes: normal sclera, normal conjunctivae and normal lids  Mouth: dentition adequate repair, no cyanosis of oral mucosa   Neck: no trachea deviation  Cardiovascular: S1S2, no murmur, non-pitting BLE edema and normal pedal pulses  Extremities: normal gait/station   Respiratory: no wheezing/crackles/rhonchi/stridor and no accessory muscle use or retractions - Diminished right middle / lower lobes.   Abdomen: no tenderness, normal bowel tones  Skin: warm and dry  Psych: alert, NAD, oriented x 3 and normal mood and affect    Labs     Labs reviewed  CBC  WBC (K/mcL)   Date Value   02/04/2019 20.2 (H)     HGB (g/dL)   Date Value   02/04/2019 12.2 (L)      HCT (%)   Date Value   02/04/2019 35.9 (L)    PLT   Date Value   02/04/2019 215 K/mcL   06/22/1999 183 K/uL        BMP  Sodium (mmol/L)   Date Value   02/04/2019 126 (L)         Potassium (mmol/L)   Date Value   02/04/2019 4.1      Chloride (mmol/L)   Date Value   02/04/2019 96 (L)         Carbon Dioxide (mmol/L)   Date Value   02/04/2019 19 (L)        BUN (mg/dL)   Date Value   02/04/2019 37 (H)         Creatinine (mg/dL)   Date Value   02/04/2019  1.23 (H)        Glucose (mg/dL)   Date Value   02/04/2019 110 (H)         CALCIUM (mg/dL)   Date Value   02/04/2019 8.2 (L)          Diagnostics     Results for orders placed during the hospital encounter of 02/01/19   CT CHEST    Impression IMPRESSION:    1.  Increased right-sided pleural effusion, now moderate in size.    2.  Consolidation in the lower right upper and middle lobes have not  significantly changed and may be infectious, inflammatory, or aspiration  related. Recommend follow-up to ensure resolution.    3.  Left-sided thyroid nodule measuring 5 mm could be better characterized  with ultrasound if clinically indicated.      I have personally reviewed the images and modified the resident's report as  necessary.             Culture Results:   Specimen Description 02/03/2019  2:30 PM SL   ASPIRATE PLEURAL (THORACENTESIS) FLUID    Gram Smear 02/03/2019  2:30 PM ACL   MANY POLYMORPHONUCLEAR CELLS    Gram Smear 02/03/2019  2:30 PM ACL   NO EPITHELIAL CELLS SEEN    Gram Smear 02/03/2019  2:30 PM ACL   NO ORGANISMS SEEN    CULTURE (P) 02/03/2019  2:30 PM ACL   STAPHYLOCOCCUS AUREUS, METHICILLIN RESISTANT Contact precautions required (P)    CULTURE 02/03/2019  2:30 PM ACL   GRAM POSITIVE COCCI IN CLUSTERS CALLED TO, READ BACK AND CONFIRMED NYDIA TERRELL (RN) ON 2/4/18 AT 2028 BY [id number]   CULTURE 02/03/2019  2:30 PM ACL   MRSA CALLED TO, READ BACK AND CONFIRMED WALTER JACKSON 2/6/2019 [id number] CZ    REPORT STATUS 02/03/2019  2:30 PM ACL   PENDING    ORGANISM 02/03/2019  2:30 PM ACL   STAPHYLOCOCCUS AUREUS, METHICILLIN RESISTANT        Echocardiogram: 1/10/2019  Normal left ventricular size, systolic function and wall thickness, LVEF 65%.  Grade I/IV diastolic dysfunction (abnormal relaxation filling pattern).  Normal right ventricular size and systolic function, RVSP 34.7 mmHg.  No significant valve abnormalities.      EKG: Normal sinus rhythm Nonspecific intraventricular block   When compared with ECG of  10-NOV-2018 16:38, Questionable change in QRS axis T wave inversion no longer evident in  Inferior leads T wave inversion no longer evident in Lateral leads     Impression & Plan      Kelsea is a 80 year old male patient with past medical history as outlined above, admitted for evaluation of chest pain, shortness of breath and generalized weakness that began 1 week prior. Imaging revealed pleural effusion. Thoracentesis was completed with follow up CT revealing persistent pleural effusion. Plan for IR to place a large bore chest tube. Follow serial imaging, outputs. Likely initiate fibrolytics. Thoracic surgery to follow.     Thank you for the opportunity to take part in the care of your patient, please feel free to call with any questions or concerns.    History and Physical Exam performed by Advanced Practice Clinician. Impression and Plan formulated by Physician and scribed by Advanced Practice Clinician.    Thoracic Surgery  Isha BERNAL [phone]    CC:  Jose Manuel Gonzalez MD    CARDIOTHORACIC SURGERY ATTENDING ADDENDUM    I, Antonio Corey MD, have seen, examined, and evaluated this patient and agree with the data and physical exam as documented in the above note. Any relevant changes have been edited in the text where appropriate.    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 CT scan showed a moderate right effusion. Thoracentesis yielded only 300 mL of fluid. WBC 21. Febrile. Procal 2.95, BNP 2500. Fluid cultures positive for MRSA. Based on imaging, the effusion looks amenable to fibrinolytic therapy, hopefully with full lung expansion. If his lung remains trapped post fibrinolytic therapy, may need to consider surgical drainage.    Thank you for transferring the care of loculated right pleural effusion to our service. We will implement the above plan. Please do not hesitate to call us with any  questions.    Antonio Corey MD

## 2019-03-29 ENCOUNTER — OFFICE VISIT (OUTPATIENT)
Dept: FAMILY MEDICINE | Facility: CLINIC | Age: 54
End: 2019-03-29
Payer: COMMERCIAL

## 2019-03-29 VITALS
HEART RATE: 78 BPM | TEMPERATURE: 97.4 F | SYSTOLIC BLOOD PRESSURE: 140 MMHG | WEIGHT: 191.4 LBS | RESPIRATION RATE: 12 BRPM | DIASTOLIC BLOOD PRESSURE: 100 MMHG | OXYGEN SATURATION: 96 % | BODY MASS INDEX: 32.68 KG/M2 | HEIGHT: 64 IN

## 2019-03-29 DIAGNOSIS — Z00.00 ROUTINE GENERAL MEDICAL EXAMINATION AT A HEALTH CARE FACILITY: Primary | ICD-10-CM

## 2019-03-29 DIAGNOSIS — Z12.31 VISIT FOR SCREENING MAMMOGRAM: ICD-10-CM

## 2019-03-29 DIAGNOSIS — Z12.11 SPECIAL SCREENING FOR MALIGNANT NEOPLASMS, COLON: ICD-10-CM

## 2019-03-29 DIAGNOSIS — I10 BENIGN ESSENTIAL HYPERTENSION: ICD-10-CM

## 2019-03-29 PROCEDURE — 99396 PREV VISIT EST AGE 40-64: CPT | Performed by: FAMILY MEDICINE

## 2019-03-29 RX ORDER — AMLODIPINE BESYLATE 5 MG/1
5 TABLET ORAL DAILY
Qty: 90 TABLET | Refills: 3 | Status: SHIPPED | OUTPATIENT
Start: 2019-03-29 | End: 2021-05-03 | Stop reason: ALTCHOICE

## 2019-03-29 RX ORDER — AMLODIPINE BESYLATE 2.5 MG/1
2.5 TABLET ORAL DAILY
Qty: 90 TABLET | Refills: 1 | Status: CANCELLED | OUTPATIENT
Start: 2019-03-29

## 2019-03-29 ASSESSMENT — MIFFLIN-ST. JEOR: SCORE: 1452.15

## 2019-03-29 NOTE — NURSING NOTE
"Initial BP (!) 162/100   Pulse 78   Temp 97.4  F (36.3  C) (Tympanic)   Resp 12   Ht 1.632 m (5' 4.25\")   Wt 86.8 kg (191 lb 6.4 oz)   SpO2 96%   BMI 32.60 kg/m   Estimated body mass index is 32.6 kg/m  as calculated from the following:    Height as of this encounter: 1.632 m (5' 4.25\").    Weight as of this encounter: 86.8 kg (191 lb 6.4 oz). .      "

## 2019-03-29 NOTE — PROGRESS NOTES
SUBJECTIVE:   CC: Britt German is an 53 year old woman who presents for preventive health visit.       Patient is a 53-year-old female who comes in today for annual physical.  She has a past medical history significant for hypertension.  She is on amlodipine 2.5 mg for this.  She reports that she ran out of her medication a couple of days before this visit.  She does mention measure her blood pressure at home and she states she runs between 130s-140s systolic.  She is concerned about her weight she says she has gained a lot of weight in the last few months and plans to start working on that.  We talked a little bit about the lifestyle modifications that she can use to help bring her blood pressure down like weight loss salt reduction.  She is due for a colonoscopy she had one in 2014 and had some polyps was recommended that she recheck this in 5 years.    Healthy Habits:    Do you get at least three servings of calcium containing foods daily (dairy, green leafy vegetables, etc.)? yes    Amount of exercise or daily activities, outside of work: None    Problems taking medications regularly No    Medication side effects: No    Have you had an eye exam in the past two years? no    Do you see a dentist twice per year? yes    Do you have sleep apnea, excessive snoring or daytime drowsiness?yes has CPAP for sleep apnea    Today's PHQ-2 Score:   PHQ-2 ( 1999 [employer]) 3/29/2019 2/1/2018   Q1: Little interest or pleasure in doing things 0 0   Q2: Feeling down, depressed or hopeless 0 0   PHQ-2 Score 0 0       Abuse: Current or Past(Physical, Sexual or Emotional)- No  Do you feel safe in your environment? Yes    Social History     Tobacco Use     Smoking status: Passive Smoke Exposure - Never Smoker     Smokeless tobacco: Never Used   Substance Use Topics     Alcohol use: Yes     Comment: 2-3 times weekly     If you drink alcohol do you typically have >3 drinks per day or >7 drinks per week? Not Applicable                      Reviewed orders with patient.  Reviewed health maintenance and updated orders accordingly - Yes  Labs reviewed in EPIC    Mammogram Screening: Patient over age 50, mutual decision to screen reflected in health maintenance.    Pertinent mammograms are reviewed under the imaging tab.  History of abnormal Pap smear: NO - age 30- 65 PAP every 3 years recommended  PAP / HPV Latest Ref Rng & Units 2017 2014 3/22/2011   PAP - NIL NIL NIL   HPV 16 DNA NEG:Negative Negative - -   HPV 18 DNA NEG:Negative Negative - -   OTHER HR HPV NEG:Negative Negative - -     Reviewed and updated as needed this visit by clinical staff  Tobacco  Allergies  Meds  Med Hx  Surg Hx  Fam Hx  Soc Hx        Reviewed and updated as needed this visit by Provider        Past Medical History:   Diagnosis Date     Hypertension      Obstructive sleep apnea      Recurrent UTI       Past Surgical History:   Procedure Laterality Date     ABDOMINOPLASTY      [facility]     ARTHROSCOPY ANKLE Left 2016    Procedure: ARTHROSCOPY ANKLE;  Surgeon: Zac Cuello DPM;  Location: [facility]     C/SECTION, LOW TRANSVERSE  ,     , Low Transverse     CL AFF SURGICAL PATHOLOGY  ,     Breast reduction     D & C       DILATION AND CURETTAGE, HYSTEROSCOPY, ABLATE ENDOMETRIUM NOVASURE, COMBINED  2014    Procedure: COMBINED DILATION AND CURETTAGE, HYSTEROSCOPY, ABLATE ENDOMETRIUM NOVASURE;  Surgeon: Rachel Hernandez DO;  Location: [facility]     INCISION AND DRAINAGE LOWER EXTREMITY, COMBINED  2011    COMBINED INCISION AND DRAINAGE LOWER EXTREMITY performed by LEY, JEFFREY DUANE at [facility]     OPEN REDUCTION INTERNAL FIXATION ANKLE Left 2015    Procedure: OPEN REDUCTION INTERNAL FIXATION ANKLE;  Surgeon: Zac Cuello DPM;  Location: [facility]     REMOVE HARDWARE ANKLE  2011    REMOVE HARDWARE ANKLE performed by LEY, JEFFREY DUANE at [facility]     REMOVE HARDWARE ANKLE Left 2016    Procedure:  "REMOVE HARDWARE ANKLE;  Surgeon: Zac Cuello DPM;  Location: [facility]     SURGICAL HISTORY OF -   2006    Right ankle fracture, ORIF     TUBAL LIGATION  1994       ROS:  CONSTITUTIONAL: NEGATIVE for fever, chills, change in weight  INTEGUMENTARY/SKIN: NEGATIVE for worrisome rashes, moles or lesions  EYES: NEGATIVE for vision changes or irritation  ENT: NEGATIVE for ear, mouth and throat problems  RESP: NEGATIVE for significant cough or SOB  BREAST: NEGATIVE for masses, tenderness or discharge  CV: NEGATIVE for chest pain, palpitations or peripheral edema  GI: NEGATIVE for nausea, abdominal pain, heartburn, or change in bowel habits  : NEGATIVE for unusual urinary or vaginal symptoms. No vaginal bleeding.  MUSCULOSKELETAL: NEGATIVE for significant arthralgias or myalgia  NEURO: NEGATIVE for weakness, dizziness or paresthesias  PSYCHIATRIC: NEGATIVE for changes in mood or affect     OBJECTIVE:   BP (!) 140/100   Pulse 78   Temp 97.4  F (36.3  C) (Tympanic)   Resp 12   Ht 1.632 m (5' 4.25\")   Wt 86.8 kg (191 lb 6.4 oz)   SpO2 96%   BMI 32.60 kg/m    EXAM:  GENERAL: healthy, alert and no distress  EYES: Eyes grossly normal to inspection, PERRL and conjunctivae and sclerae normal  HENT: ear canals and TM's normal, nose and mouth without ulcers or lesions  NECK: no adenopathy, no asymmetry, masses, or scars and thyroid normal to palpation  RESP: lungs clear to auscultation - no rales, rhonchi or wheezes  BREAST: normal without masses, tenderness or nipple discharge and no palpable axillary masses or adenopathy  CV: regular rate and rhythm, normal S1 S2, no S3 or S4, no murmur, click or rub, no peripheral edema and peripheral pulses strong  ABDOMEN: soft, nontender, no hepatosplenomegaly, no masses and bowel sounds normal  MS: no gross musculoskeletal defects noted, no edema  SKIN: no suspicious lesions or rashes  PSYCH: mentation appears normal, affect normal/bright    Diagnostic Test Results:  none " "    ASSESSMENT/PLAN:   1. Routine general medical examination at a health care facility  Patient will return when fasting   - Basic metabolic panel; Future  - Lipid panel reflex to direct LDL Fasting; Future    2. Benign essential hypertension  BP is still high recommend coming back for a recheck in a couple of weeks. Adjusted her medication dose.  - amLODIPine (NORVASC) 5 MG tablet; Take 1 tablet (5 mg) by mouth daily  Dispense: 90 tablet; Refill: 3    3. Visit for screening mammogram  - MA Screening Digital Bilateral; Future    4. Special screening for malignant neoplasms, colon  - GASTROENTEROLOGY ADULT REF PROCEDURE ONLY [facility] [phone]    COUNSELING:   Reviewed preventive health counseling, as reflected in patient instructions       Regular exercise       Healthy diet/nutrition       Vision screening    BP Readings from Last 1 Encounters:   03/29/19 (!) 140/100     Estimated body mass index is 32.6 kg/m  as calculated from the following:    Height as of this encounter: 1.632 m (5' 4.25\").    Weight as of this encounter: 86.8 kg (191 lb 6.4 oz).      Weight management plan: Discussed healthy diet and exercise guidelines     reports that she is a non-smoker but has been exposed to tobacco smoke. The exposure started about 37 years ago. She has been exposed to 0.00 packs per day for the past 1.00 year. she has never used smokeless tobacco.      Counseling Resources:  ATP IV Guidelines  Pooled Cohorts Equation Calculator  Breast Cancer Risk Calculator  FRAX Risk Assessment  ICSI Preventive Guidelines  Dietary Guidelines for Americans, 2010  [employer] MyPlate  ASA Prophylaxis  Lung CA Screening    Tyesha Silva MD  [facility]  "

## 2019-04-01 ENCOUNTER — HOSPITAL ENCOUNTER (OUTPATIENT)
Facility: CLINIC | Age: 54
End: 2019-04-01
Attending: SURGERY | Admitting: SURGERY
Payer: COMMERCIAL

## 2019-04-08 ENCOUNTER — HOSPITAL ENCOUNTER (OUTPATIENT)
Dept: MAMMOGRAPHY | Facility: CLINIC | Age: 54
Discharge: HOME | End: 2019-04-08
Attending: FAMILY MEDICINE | Admitting: FAMILY MEDICINE
Payer: COMMERCIAL

## 2019-04-08 DIAGNOSIS — Z12.31 VISIT FOR SCREENING MAMMOGRAM: ICD-10-CM

## 2019-04-08 PROCEDURE — 77067 SCR MAMMO BI INCL CAD: CPT

## 2019-04-18 ENCOUNTER — TELEPHONE (OUTPATIENT)
Dept: FAMILY MEDICINE | Facility: CLINIC | Age: 54
End: 2019-04-18

## 2019-04-18 ENCOUNTER — ALLIED HEALTH/NURSE VISIT (OUTPATIENT)
Dept: FAMILY MEDICINE | Facility: CLINIC | Age: 54
End: 2019-04-18
Payer: COMMERCIAL

## 2019-04-18 VITALS — SYSTOLIC BLOOD PRESSURE: 130 MMHG | DIASTOLIC BLOOD PRESSURE: 80 MMHG

## 2019-04-18 DIAGNOSIS — Z00.00 ROUTINE GENERAL MEDICAL EXAMINATION AT A HEALTH CARE FACILITY: ICD-10-CM

## 2019-04-18 DIAGNOSIS — I10 HYPERTENSION GOAL BP (BLOOD PRESSURE) < 140/90: Primary | ICD-10-CM

## 2019-04-18 LAB
ANION GAP SERPL CALCULATED.3IONS-SCNC: 4 MMOL/L (ref 3–14)
BUN SERPL-MCNC: 22 MG/DL (ref 7–30)
CALCIUM SERPL-MCNC: 9.2 MG/DL (ref 8.5–10.1)
CHLORIDE SERPL-SCNC: 104 MMOL/L (ref 94–109)
CHOLEST SERPL-MCNC: 164 MG/DL
CO2 SERPL-SCNC: 27 MMOL/L (ref 20–32)
CREAT SERPL-MCNC: 0.69 MG/DL (ref 0.52–1.04)
GFR SERPL CREATININE-BSD FRML MDRD: >90 ML/MIN/{1.73_M2}
GLUCOSE SERPL-MCNC: 100 MG/DL (ref 70–99)
HDLC SERPL-MCNC: 47 MG/DL
LDLC SERPL CALC-MCNC: 103 MG/DL
NONHDLC SERPL-MCNC: 117 MG/DL
POTASSIUM SERPL-SCNC: 3.9 MMOL/L (ref 3.4–5.3)
SODIUM SERPL-SCNC: 135 MMOL/L (ref 133–144)
TRIGL SERPL-MCNC: 71 MG/DL

## 2019-04-18 PROCEDURE — 36415 COLL VENOUS BLD VENIPUNCTURE: CPT | Performed by: FAMILY MEDICINE

## 2019-04-18 PROCEDURE — 80061 LIPID PANEL: CPT | Performed by: FAMILY MEDICINE

## 2019-04-18 PROCEDURE — 80048 BASIC METABOLIC PNL TOTAL CA: CPT | Performed by: FAMILY MEDICINE

## 2019-04-18 PROCEDURE — 99207 ZZC NO CHARGE NURSE ONLY: CPT

## 2019-04-18 NOTE — TELEPHONE ENCOUNTER
Britt German is a 53 year old year old patient who comes in today for a Blood Pressure check because of medication change, increased Amlodipine dose to 5 mg daily, 3/29/19 Office visit.  Vital Signs as repeated by RN Once  Patient is taking medication as prescribed  Patient is tolerating medications well.    Current complaints: none    Patient did not take medications today because she thought she couldn't have water with her lab draw this morning. Last taken yesterday at around 7 am.     BP Readings from Last 3 Encounters:   04/18/19 130/80   03/29/19 (!) 140/100   12/06/18 (!) 160/95       Disposition:  patient to continue with the same medication, will update Once labs are processed. Routed to provider in telephone encounter.

## 2019-04-22 ENCOUNTER — ANESTHESIA EVENT (OUTPATIENT)
Dept: SURGERY | Facility: CLINIC | Age: 54
End: 2019-04-22

## 2019-04-24 NOTE — RESULT ENCOUNTER NOTE
Please inform patient that test result was within normal parameters.     Thank you.     Tyesha Silva M.D.

## 2019-04-26 ENCOUNTER — ANESTHESIA (OUTPATIENT)
Dept: SURGERY | Facility: CLINIC | Age: 54
End: 2019-04-26

## 2019-06-04 ENCOUNTER — OFFICE VISIT (OUTPATIENT)
Dept: FAMILY MEDICINE | Facility: CLINIC | Age: 54
End: 2019-06-04
Payer: COMMERCIAL

## 2019-06-04 VITALS
BODY MASS INDEX: 29.28 KG/M2 | WEIGHT: 171.5 LBS | HEIGHT: 64 IN | DIASTOLIC BLOOD PRESSURE: 90 MMHG | RESPIRATION RATE: 18 BRPM | SYSTOLIC BLOOD PRESSURE: 135 MMHG | HEART RATE: 79 BPM | OXYGEN SATURATION: 100 % | TEMPERATURE: 97.5 F

## 2019-06-04 DIAGNOSIS — J20.9 ACUTE BRONCHITIS WITH SYMPTOMS > 10 DAYS: Primary | ICD-10-CM

## 2019-06-04 PROCEDURE — 99213 OFFICE O/P EST LOW 20 MIN: CPT | Performed by: INTERNAL MEDICINE

## 2019-06-04 RX ORDER — BENZONATATE 200 MG/1
200 CAPSULE ORAL 3 TIMES DAILY PRN
Qty: 21 CAPSULE | Refills: 0 | Status: SHIPPED | OUTPATIENT
Start: 2019-06-04 | End: 2021-05-03

## 2019-06-04 RX ORDER — AZITHROMYCIN 250 MG/1
TABLET, FILM COATED ORAL
Qty: 6 TABLET | Refills: 0 | Status: SHIPPED | OUTPATIENT
Start: 2019-06-04 | End: 2019-06-09

## 2019-06-04 ASSESSMENT — MIFFLIN-ST. JEOR: SCORE: 1356.89

## 2019-06-04 NOTE — PROGRESS NOTES
"Subjective     Britt German is a 54 year old female who presents to clinic today for the following health issues:    HPI   RESPIRATORY SYMPTOMS      Duration: three weeks    Description  cough and hoarse voice    Severity: moderate    Accompanying signs and symptoms: feels \"deep\" and short of breath at times, productive of phlegm.  Initially had some headache and sore throat but these have improved.  She is frustrated by ongoing coughing jags.  No fevers, chills.  Has some fatigue.  No sinus or ear pain.  Had some itchiness a couple of weeks ago but that resolved.      History (predisposing factors):  none    Precipitating or alleviating factors: None    Therapies tried and outcome:  Dayquil and Nyquil- help with sleep but otherwise not      Patient Active Problem List   Diagnosis     Family history of breast cancer in mother     Tubal ligation status     Hyperlipidemia with target LDL less than 160     Recurrent UTI     Hypertension goal BP (blood pressure) < 140/90     Indeterminate pulmonary nodules     LULÚ (obstructive sleep apnea)     Past Surgical History:   Procedure Laterality Date     ABDOMINOPLASTY      [facility]     ARTHROSCOPY ANKLE Left 2016    Procedure: ARTHROSCOPY ANKLE;  Surgeon: Zac Cuello DPM;  Location: [facility]     C/SECTION, LOW TRANSVERSE  ,     , Low Transverse     CL AFF SURGICAL PATHOLOGY  ,     Breast reduction     D & C       DILATION AND CURETTAGE, HYSTEROSCOPY, ABLATE ENDOMETRIUM NOVASURE, COMBINED  2014    Procedure: COMBINED DILATION AND CURETTAGE, HYSTEROSCOPY, ABLATE ENDOMETRIUM NOVASURE;  Surgeon: Rachel Hernandez DO;  Location: [facility]     INCISION AND DRAINAGE LOWER EXTREMITY, COMBINED  2011    COMBINED INCISION AND DRAINAGE LOWER EXTREMITY performed by LEY, JEFFREY DUANE at [facility]     OPEN REDUCTION INTERNAL FIXATION ANKLE Left 2015    Procedure: OPEN REDUCTION INTERNAL FIXATION ANKLE;  Surgeon: Zac Cuello" "MARCO A Wen;  Location: [facility]     REMOVE HARDWARE ANKLE  2/22/2011    REMOVE HARDWARE ANKLE performed by LEY, JEFFREY DUANE at [facility]     REMOVE HARDWARE ANKLE Left 9/1/2016    Procedure: REMOVE HARDWARE ANKLE;  Surgeon: Zac Cuello DPM;  Location: [facility]     SURGICAL HISTORY OF -   2006    Right ankle fracture, ORIF     TUBAL LIGATION  1994       Social History     Tobacco Use     Smoking status: Never Smoker     Smokeless tobacco: Never Used   Substance Use Topics     Alcohol use: Yes     Comment: 2-3 times weekly     Family History   Problem Relation Age of Onset     Breast Cancer Mother         dx age 38     Diabetes Sister      Cardiovascular Father         CHF     Respiratory Father         COPD     Cardiovascular Son         aortic stenosis     Diabetes Sister          Current Outpatient Medications   Medication Sig Dispense Refill     amLODIPine (NORVASC) 5 MG tablet Take 1 tablet (5 mg) by mouth daily 90 tablet 3     azithromycin (ZITHROMAX) 250 MG tablet Take 2 tablets (500 mg) by mouth daily for 1 day, THEN 1 tablet (250 mg) daily for 4 days. 6 tablet 0     benzonatate (TESSALON) 200 MG capsule Take 1 capsule (200 mg) by mouth 3 times daily as needed for cough 21 capsule 0     Allergies   Allergen Reactions     Hydrochlorothiazide Rash     Lisinopril      Hives         Reviewed and updated as needed this visit by Provider         Review of Systems   ROS COMP: Constitutional, HEENT, pulmonary systems are negative, except as otherwise noted.      Objective    /90   Pulse 79   Temp 97.5  F (36.4  C) (Tympanic)   Resp 18   Ht 1.632 m (5' 4.25\")   Wt 77.8 kg (171 lb 8 oz)   SpO2 100%   BMI 29.21 kg/m    Body mass index is 29.21 kg/m .  Physical Exam     GENERAL: alert and no distress  EYES: Eyes grossly normal to inspection, PERRL and conjunctivae and sclerae normal  HENT: ear canals and TMs normal, sinuses non tender to percussion, nose and mouth without ulcers or lesions, oropharynx mildly " red  NECK: no adenopathy, no asymmetry, masses, or scars  RESP: lungs clear to auscultation - no rales, rhonchi or wheezes  CV: regular rate and rhythm, normal S1 S2, no S3 or S4, no murmur, click or rub             Assessment & Plan     1. Acute bronchitis with symptoms > 10 days    Britt presents with 3 weeks of cough.  This may be post-viral cough syndrome, but we'll try a course of azithromycin in case of bacterial bronchitis.  Lungs are clear and VSS, so unlikely pneumonia.      - benzonatate (TESSALON) 200 MG capsule; Take 1 capsule (200 mg) by mouth 3 times daily as needed for cough  Dispense: 21 capsule; Refill: 0  - azithromycin (ZITHROMAX) 250 MG tablet; Take 2 tablets (500 mg) by mouth daily for 1 day, THEN 1 tablet (250 mg) daily for 4 days.  Dispense: 6 tablet; Refill: 0     Return in about 1 week (around 6/11/2019), or if symptoms worsen or fail to improve.    Behzad Robert MD  [facility] -

## 2019-11-06 ENCOUNTER — HEALTH MAINTENANCE LETTER (OUTPATIENT)
Age: 54
End: 2019-11-06

## 2020-02-16 ENCOUNTER — HEALTH MAINTENANCE LETTER (OUTPATIENT)
Age: 55
End: 2020-02-16

## 2020-02-25 NOTE — PATIENT INSTRUCTIONS
Patient seen in office.   This may just be a post-viral cough syndrome- that can take a few months to resolve after a viral infection.    We'll try the Zpak antibiotics in case of bacterial bronchitis.

## 2020-03-19 ENCOUNTER — VIRTUAL VISIT (OUTPATIENT)
Dept: FAMILY MEDICINE | Facility: CLINIC | Age: 55
End: 2020-03-19
Payer: COMMERCIAL

## 2020-03-19 ENCOUNTER — TELEPHONE (OUTPATIENT)
Dept: FAMILY MEDICINE | Facility: CLINIC | Age: 55
End: 2020-03-19

## 2020-03-19 DIAGNOSIS — I10 HYPERTENSION GOAL BP (BLOOD PRESSURE) < 140/90: Primary | ICD-10-CM

## 2020-03-19 PROCEDURE — 99441 ZZC PHYSICIAN TELEPHONE EVALUATION 5-10 MIN: CPT | Performed by: NURSE PRACTITIONER

## 2020-03-19 RX ORDER — AMLODIPINE BESYLATE 10 MG/1
10 TABLET ORAL DAILY
Qty: 90 TABLET | Refills: 1 | Status: SHIPPED | OUTPATIENT
Start: 2020-03-19 | End: 2021-05-03

## 2020-03-19 NOTE — PROGRESS NOTES
"Britt German is a 54 year old female who is being evaluated via a billable telephone visit.      The patient has been notified of following:     \"This telephone visit will be conducted via a call between you and your physician/provider. We have found that certain health care needs can be provided without the need for a physical exam.  This service lets us provide the care you need with a short phone conversation.  If a prescription is necessary we can send it directly to your pharmacy.  If lab work is needed we can place an order for that and you can then stop by our lab to have the test done at a later time.    If during the course of the call the physician/provider feels a telephone visit is not appropriate, you will not be charged for this service.\"     Britt German complains of    Chief Complaint   Patient presents with     Hypertension       I have reviewed and updated the patient's Past Medical History, Social History, Family History and Medication List.    ALLERGIES  Hydrochlorothiazide and Lisinopril    Hypertension Follow-up      Do you check your blood pressure regularly outside of the clinic? Yes     Are you following a low salt diet? Yes    Are your blood pressures ever more than 140 on the top number (systolic) OR more   than 90 on the bottom number (diastolic), for example 140/90? Yes 10 days ago 185/100 that was the highest it has been.     Her concern is should she be taking 2 tablets a day like she has in the past, BP consistently >160/90 for 10 days  Above HPI reviewed. 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 She did self adjust her medication to 2 tablets a day that did lower her blood pressure. She is focusing on her diet and increasing exercise. She has previously had reactions to ACEI and and hydrochlorothiazide. She denies headaches, dizziness, " chest pain, visual changes, extremity swelling.      Assessment/Plan:  1. Hypertension goal BP (blood pressure) < 140/90  Asymptomatic of HTN. As she has had previous reactions to hydrochlorothiazide and ACEI, will increase amlodipine to 10mg daily. Discussed s/s hypotension. Future BMP ordered, advised follow up once we are beyond the COVID-19 pandemic.  - amLODIPine (NORVASC) 10 MG tablet; Take 1 tablet (10 mg) by mouth daily  Dispense: 90 tablet; Refill: 1  - Basic metabolic panel; Future    Phone call duration:  5 minutes    DONTE Diehl CNP

## 2020-03-19 NOTE — TELEPHONE ENCOUNTER
Pt will be seen this afternoon for blood pressure follow up.  She has not been seen for blood pressure in one year.  Una Verdugo RN

## 2020-03-19 NOTE — TELEPHONE ENCOUNTER
Reason for call:    Symptom or request:     Patient reporting bp is averaging  170/90 on average the highest being 185/100.    Currently /87 this AM.     Patient takes bp in AM, reports tightness in chest--on occasion- currently no sx - she eats health, exercises daily.     Pharmacy [facility]     Best Time:  any    Can we leave a detailed message on this number?  YES     Sayda MONTEIRO  Station

## 2020-06-17 ENCOUNTER — MYC MEDICAL ADVICE (OUTPATIENT)
Dept: FAMILY MEDICINE | Facility: CLINIC | Age: 55
End: 2020-06-17

## 2020-06-17 DIAGNOSIS — I10 HYPERTENSION GOAL BP (BLOOD PRESSURE) < 140/90: Primary | ICD-10-CM

## 2020-06-18 RX ORDER — LOSARTAN POTASSIUM 50 MG/1
50 TABLET ORAL DAILY
Qty: 30 TABLET | Refills: 1 | Status: SHIPPED | OUTPATIENT
Start: 2020-06-18 | End: 2020-08-21

## 2020-06-18 NOTE — TELEPHONE ENCOUNTER
Monika,    Patient sends a my chart message with ankle/foot swelling with amlodipine.  Please advise. Ermelinad ZIMMERMAN RN

## 2020-06-18 NOTE — TELEPHONE ENCOUNTER
Unfortunately, the swelling caused by amlodipine is not due to increased volume, an additional medication will not be helpful to reduce the swelling. Peripheral edema is a known side effect of calcium channel blockers, and since it is not helping to reduce blood pressure, we need to stop the medication.    I spoke with a pharmacist about medication options given your allergies to hydrochlorothiazide and lisinopril.  I think the next right choice to control your blood pressure is losartan.  There is a very low risk of cross sensitivity given the lisinopril reaction, but the pharmacist agree that this should be safe. Just let us know if you develop any rashes or other concerns.    Start losartan 50mg daily, I will send this to the pharmacy. RN BP check and ordered lab in 2 weeks.    Monika Villanueva, CNP

## 2020-08-19 DIAGNOSIS — I10 HYPERTENSION GOAL BP (BLOOD PRESSURE) < 140/90: ICD-10-CM

## 2020-08-20 ENCOUNTER — MYC REFILL (OUTPATIENT)
Dept: FAMILY MEDICINE | Facility: CLINIC | Age: 55
End: 2020-08-20

## 2020-08-20 DIAGNOSIS — I10 HYPERTENSION GOAL BP (BLOOD PRESSURE) < 140/90: ICD-10-CM

## 2020-08-20 NOTE — TELEPHONE ENCOUNTER
"Routing refill request to provider for review/approval because:  Labs not current:  Cr, potassium      Requested Prescriptions   Pending Prescriptions Disp Refills     losartan (COZAAR) 50 MG tablet 30 tablet 1     Sig: Take 1 tablet (50 mg) by mouth daily       Angiotensin-II Receptors Failed - 8/19/2020 11:21 AM        Failed - Last blood pressure under 140/90 in past 12 months     BP Readings from Last 3 Encounters:   06/04/19 135/90   04/18/19 130/80   03/29/19 (!) 140/100                 Failed - Normal serum creatinine on file in past 12 months     Recent Labs   Lab Test 04/18/19  0909   CR 0.69       Ok to refill medication if creatinine is low          Failed - Normal serum potassium on file in past 12 months     Recent Labs   Lab Test 04/18/19  0909   POTASSIUM 3.9                    Passed - Recent (12 mo) or future (30 days) visit within the authorizing provider's specialty     Patient has had an office visit with the authorizing provider or a provider within the authorizing providers department within the previous 12 mos or has a future within next 30 days. See \"Patient Info\" tab in inbasket, or \"Choose Columns\" in Meds & Orders section of the refill encounter.              Passed - Medication is active on med list        Passed - Patient is age 18 or older        Passed - No active pregnancy on record        Passed - No positive pregnancy test in past 12 months             "

## 2020-08-21 ENCOUNTER — MYC REFILL (OUTPATIENT)
Dept: FAMILY MEDICINE | Facility: CLINIC | Age: 55
End: 2020-08-21

## 2020-08-21 DIAGNOSIS — I10 HYPERTENSION GOAL BP (BLOOD PRESSURE) < 140/90: ICD-10-CM

## 2020-08-24 RX ORDER — LOSARTAN POTASSIUM 50 MG/1
50 TABLET ORAL DAILY
Qty: 30 TABLET | Refills: 1 | Status: SHIPPED | OUTPATIENT
Start: 2020-08-24 | End: 2020-12-01

## 2020-08-24 NOTE — TELEPHONE ENCOUNTER
"Requested Prescriptions   Pending Prescriptions Disp Refills     losartan (COZAAR) 50 MG tablet 30 tablet 1     Sig: Take 1 tablet (50 mg) by mouth daily       Angiotensin-II Receptors Failed - 8/20/2020  4:10 PM        Failed - Last blood pressure under 140/90 in past 12 months     BP Readings from Last 3 Encounters:   06/04/19 135/90   04/18/19 130/80   03/29/19 (!) 140/100                 Failed - Normal serum creatinine on file in past 12 months     Recent Labs   Lab Test 04/18/19  0909   CR 0.69       Ok to refill medication if creatinine is low          Failed - Normal serum potassium on file in past 12 months     Recent Labs   Lab Test 04/18/19  0909   POTASSIUM 3.9                    Passed - Recent (12 mo) or future (30 days) visit within the authorizing provider's specialty     Patient has had an office visit with the authorizing provider or a provider within the authorizing providers department within the previous 12 mos or has a future within next 30 days. See \"Patient Info\" tab in inbasket, or \"Choose Columns\" in Meds & Orders section of the refill encounter.              Passed - Medication is active on med list        Passed - Patient is age 18 or older        Passed - No active pregnancy on record        Passed - No positive pregnancy test in past 12 months             "

## 2020-08-26 RX ORDER — LOSARTAN POTASSIUM 50 MG/1
50 TABLET ORAL DAILY
Qty: 30 TABLET | Refills: 1 | Status: SHIPPED | OUTPATIENT
Start: 2020-08-26 | End: 2020-12-01

## 2020-08-31 NOTE — PROGRESS NOTES
Subjective     Britt German is a 55 year old female who presents to clinic today for the following health issues:    HPI   55 yr old female who presents with middle and ring finger pain and discomfort ongoing for a couple of months. 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 She states that since then she has swelling and pain and reduction in motion. She has been unable to wear her wedding rings because of the swelling.    Also has a tendon hardening on the palm of left hand. Been there for years appears to be worsening.    Musculoskeletal problem/pain  Onset/Duration: 5 months  Description  Location: left ring and middle finger -   Joint Swelling: Yes, more swollen at the end of the day  Redness: no  Pain: yes  Warmth: no  Intensity:  mild, moderate  Progression of Symptoms:  improving  Accompanying signs and symptoms:   Fevers: no  Numbness/tingling/weakness: no  History  Trauma to the area: YES  Recent illness:  no  Previous similar problem: no  Previous evaluation:  no  Precipitating or alleviating factors:  Aggravating factors include: gripping a golf club  Therapies tried and outcome: nothing      Review of Systems   CONSTITUTIONAL: NEGATIVE for fever, chills, change in weight  INTEGUMENTARY/SKIN: NEGATIVE for worrisome rashes, moles or lesions  ENT/MOUTH: NEGATIVE for ear, mouth and throat problems  RESP: NEGATIVE for significant cough or SOB  CV: NEGATIVE for chest pain, palpitations or peripheral edema  MUSCULOSKELETAL: POSITIVE  for joint pain finger   NEURO: NEGATIVE for weakness, dizziness or paresthesias  PSYCHIATRIC: NEGATIVE for changes in mood or affect      Objective    BP (!) 138/102   Pulse 80   Temp 96.2  F (35.7  C) (Tympanic)   Resp 12   Wt 77.6 kg (171 lb)   SpO2 96%   BMI 29.12 kg/m    Body mass index is 29.12 kg/m .  Physical Exam  Constitutional:       Appearance: Normal appearance.   HENT:      Head: Normocephalic and atraumatic.   Musculoskeletal:          General: Swelling, tenderness, deformity and signs of injury present.      Left hand: She exhibits decreased range of motion, tenderness, deformity and swelling.        Hands:    Skin:     General: Skin is warm and dry.   Neurological:      Mental Status: She is alert and oriented to person, place, and time.   Psychiatric:         Mood and Affect: Mood normal.         Behavior: Behavior normal.            X-rays  Appears that she has avulsion injuries in both fingers  Await radiology report    Assessment & Plan     Injury of finger of left hand, initial encounter  referralplaced  - XR Finger Left G/E 2 Views  - Orthopedic & Spine  Referral    Tendonitis  - Orthopedic & Spine  Referral         FUTURE APPOINTMENTS:       - Follow-up visit in one month or sooner as needed.    Return in about 4 weeks (around 9/29/2020) for In-Clinic Visit.    Tyesha Silva MD  [facility]

## 2020-09-01 ENCOUNTER — OFFICE VISIT (OUTPATIENT)
Dept: FAMILY MEDICINE | Facility: CLINIC | Age: 55
End: 2020-09-01
Payer: COMMERCIAL

## 2020-09-01 ENCOUNTER — ANCILLARY PROCEDURE (OUTPATIENT)
Dept: GENERAL RADIOLOGY | Facility: CLINIC | Age: 55
End: 2020-09-01
Attending: FAMILY MEDICINE
Payer: COMMERCIAL

## 2020-09-01 VITALS
WEIGHT: 171 LBS | HEART RATE: 80 BPM | OXYGEN SATURATION: 96 % | RESPIRATION RATE: 12 BRPM | SYSTOLIC BLOOD PRESSURE: 138 MMHG | DIASTOLIC BLOOD PRESSURE: 102 MMHG | BODY MASS INDEX: 29.12 KG/M2 | TEMPERATURE: 96.2 F

## 2020-09-01 DIAGNOSIS — S69.92XA INJURY OF FINGER OF LEFT HAND, INITIAL ENCOUNTER: Primary | ICD-10-CM

## 2020-09-01 DIAGNOSIS — S69.92XA INJURY OF FINGER OF LEFT HAND, INITIAL ENCOUNTER: ICD-10-CM

## 2020-09-01 DIAGNOSIS — M77.9 TENDONITIS: ICD-10-CM

## 2020-09-01 PROCEDURE — 99213 OFFICE O/P EST LOW 20 MIN: CPT | Performed by: FAMILY MEDICINE

## 2020-09-01 PROCEDURE — 73140 X-RAY EXAM OF FINGER(S): CPT | Mod: LT

## 2020-09-01 NOTE — NURSING NOTE
"Initial BP (!) 142/100   Pulse 80   Temp 96.2  F (35.7  C) (Tympanic)   Resp 12   Wt 77.6 kg (171 lb)   SpO2 96%   BMI 29.12 kg/m   Estimated body mass index is 29.12 kg/m  as calculated from the following:    Height as of 6/4/19: 1.632 m (5' 4.25\").    Weight as of this encounter: 77.6 kg (171 lb). .    Adwoa Hughes, CMA    "

## 2020-09-09 ENCOUNTER — TRANSFERRED RECORDS (OUTPATIENT)
Dept: HEALTH INFORMATION MANAGEMENT | Facility: CLINIC | Age: 55
End: 2020-09-09

## 2020-10-20 ENCOUNTER — HOSPITAL ENCOUNTER (OUTPATIENT)
Dept: MAMMOGRAPHY | Facility: CLINIC | Age: 55
Discharge: HOME | End: 2020-10-20
Attending: FAMILY MEDICINE | Admitting: FAMILY MEDICINE
Payer: COMMERCIAL

## 2020-10-20 DIAGNOSIS — Z12.31 VISIT FOR SCREENING MAMMOGRAM: ICD-10-CM

## 2020-10-20 PROCEDURE — 77067 SCR MAMMO BI INCL CAD: CPT

## 2020-11-29 ENCOUNTER — HEALTH MAINTENANCE LETTER (OUTPATIENT)
Age: 55
End: 2020-11-29

## 2020-12-01 ENCOUNTER — MYC MEDICAL ADVICE (OUTPATIENT)
Dept: FAMILY MEDICINE | Facility: CLINIC | Age: 55
End: 2020-12-01

## 2020-12-01 DIAGNOSIS — I10 HYPERTENSION GOAL BP (BLOOD PRESSURE) < 140/90: ICD-10-CM

## 2020-12-01 RX ORDER — LOSARTAN POTASSIUM 50 MG/1
50 TABLET ORAL DAILY
Qty: 90 TABLET | Refills: 0 | Status: SHIPPED | OUTPATIENT
Start: 2020-12-01 | End: 2021-03-11

## 2020-12-01 NOTE — TELEPHONE ENCOUNTER
"Requested Prescriptions   Pending Prescriptions Disp Refills     losartan (COZAAR) 50 MG tablet [Pharmacy Med Name: LOSARTAN POTASSIUM 50MG TABS] 30 tablet 1     Sig: TAKE ONE TABLET BY MOUTH ONCE DAILY       Angiotensin-II Receptors Failed - 12/1/2020  2:57 PM        Failed - Last blood pressure under 140/90 in past 12 months     BP Readings from Last 3 Encounters:   09/01/20 (!) 138/102   06/04/19 135/90   04/18/19 130/80                 Failed - Normal serum creatinine on file in past 12 months     Recent Labs   Lab Test 04/18/19  0909   CR 0.69       Ok to refill medication if creatinine is low          Failed - Normal serum potassium on file in past 12 months     Recent Labs   Lab Test 04/18/19  0909   POTASSIUM 3.9                    Passed - Recent (12 mo) or future (30 days) visit within the authorizing provider's specialty     Patient has had an office visit with the authorizing provider or a provider within the authorizing providers department within the previous 12 mos or has a future within next 30 days. See \"Patient Info\" tab in inbasket, or \"Choose Columns\" in Meds & Orders section of the refill encounter.              Passed - Medication is active on med list        Passed - Patient is age 18 or older        Passed - No active pregnancy on record        Passed - No positive pregnancy test in past 12 months             "

## 2020-12-04 RX ORDER — LOSARTAN POTASSIUM 50 MG/1
TABLET ORAL
Qty: 30 TABLET | Refills: 1 | OUTPATIENT
Start: 2020-12-04

## 2021-03-11 ENCOUNTER — MYC REFILL (OUTPATIENT)
Dept: FAMILY MEDICINE | Facility: CLINIC | Age: 56
End: 2021-03-11

## 2021-03-11 DIAGNOSIS — I10 HYPERTENSION GOAL BP (BLOOD PRESSURE) < 140/90: ICD-10-CM

## 2021-03-11 RX ORDER — LOSARTAN POTASSIUM 50 MG/1
50 TABLET ORAL DAILY
Qty: 30 TABLET | Refills: 0 | Status: SHIPPED | OUTPATIENT
Start: 2021-03-11 | End: 2021-04-13

## 2021-03-11 NOTE — TELEPHONE ENCOUNTER
"Patient comment: please refill prescribtion asap--my blood pressure today was 137/76  Requested Prescriptions   Pending Prescriptions Disp Refills     losartan (COZAAR) 50 MG tablet 90 tablet 0     Sig: Take 1 tablet (50 mg) by mouth daily       Angiotensin-II Receptors Failed - 3/11/2021  5:19 PM        Failed - Last blood pressure under 140/90 in past 12 months     BP Readings from Last 3 Encounters:   09/01/20 (!) 138/102   06/04/19 135/90   04/18/19 130/80                 Failed - Normal serum creatinine on file in past 12 months     Recent Labs   Lab Test 04/18/19  0909   CR 0.69       Ok to refill medication if creatinine is low          Failed - Normal serum potassium on file in past 12 months     Recent Labs   Lab Test 04/18/19  0909   POTASSIUM 3.9                    Passed - Recent (12 mo) or future (30 days) visit within the authorizing provider's specialty     Patient has had an office visit with the authorizing provider or a provider within the authorizing providers department within the previous 12 mos or has a future within next 30 days. See \"Patient Info\" tab in inbasket, or \"Choose Columns\" in Meds & Orders section of the refill encounter.              Passed - Medication is active on med list        Passed - Patient is age 18 or older        Passed - No active pregnancy on record        Passed - No positive pregnancy test in past 12 months             "
No

## 2021-04-13 ENCOUNTER — MYC REFILL (OUTPATIENT)
Dept: FAMILY MEDICINE | Facility: CLINIC | Age: 56
End: 2021-04-13

## 2021-04-13 DIAGNOSIS — I10 HYPERTENSION GOAL BP (BLOOD PRESSURE) < 140/90: ICD-10-CM

## 2021-04-16 RX ORDER — LOSARTAN POTASSIUM 50 MG/1
50 TABLET ORAL DAILY
Qty: 30 TABLET | Refills: 0 | Status: SHIPPED | OUTPATIENT
Start: 2021-04-16 | End: 2021-05-03

## 2021-04-16 NOTE — TELEPHONE ENCOUNTER
Forwarding refill request to PCP due to failed protocol. Marked as high priority, as patient only has 2 pills left. Patient due for HTN visit and related labs. MyChart sent advising a visit.     Charisma Ritter RN  [facility]

## 2021-05-03 ENCOUNTER — OFFICE VISIT (OUTPATIENT)
Dept: FAMILY MEDICINE | Facility: CLINIC | Age: 56
End: 2021-05-03
Payer: COMMERCIAL

## 2021-05-03 VITALS
SYSTOLIC BLOOD PRESSURE: 126 MMHG | OXYGEN SATURATION: 100 % | TEMPERATURE: 96.6 F | HEART RATE: 63 BPM | RESPIRATION RATE: 16 BRPM | DIASTOLIC BLOOD PRESSURE: 82 MMHG | WEIGHT: 154.4 LBS | BODY MASS INDEX: 26.3 KG/M2

## 2021-05-03 DIAGNOSIS — I10 HYPERTENSION GOAL BP (BLOOD PRESSURE) < 140/90: ICD-10-CM

## 2021-05-03 LAB
ANION GAP SERPL CALCULATED.3IONS-SCNC: 4 MMOL/L (ref 3–14)
BUN SERPL-MCNC: 18 MG/DL (ref 7–30)
CALCIUM SERPL-MCNC: 9.6 MG/DL (ref 8.5–10.1)
CHLORIDE SERPL-SCNC: 106 MMOL/L (ref 94–109)
CHOLEST SERPL-MCNC: 176 MG/DL
CO2 SERPL-SCNC: 27 MMOL/L (ref 20–32)
CREAT SERPL-MCNC: 0.83 MG/DL (ref 0.52–1.04)
GFR SERPL CREATININE-BSD FRML MDRD: 77 ML/MIN/{1.73_M2}
GLUCOSE SERPL-MCNC: 69 MG/DL (ref 70–99)
HDLC SERPL-MCNC: 62 MG/DL
LDLC SERPL CALC-MCNC: 106 MG/DL
NONHDLC SERPL-MCNC: 114 MG/DL
POTASSIUM SERPL-SCNC: 4.1 MMOL/L (ref 3.4–5.3)
SODIUM SERPL-SCNC: 137 MMOL/L (ref 133–144)
TRIGL SERPL-MCNC: 42 MG/DL

## 2021-05-03 PROCEDURE — 99213 OFFICE O/P EST LOW 20 MIN: CPT | Performed by: FAMILY MEDICINE

## 2021-05-03 PROCEDURE — 36415 COLL VENOUS BLD VENIPUNCTURE: CPT | Performed by: FAMILY MEDICINE

## 2021-05-03 PROCEDURE — 80061 LIPID PANEL: CPT | Performed by: FAMILY MEDICINE

## 2021-05-03 PROCEDURE — 80048 BASIC METABOLIC PNL TOTAL CA: CPT | Performed by: FAMILY MEDICINE

## 2021-05-03 RX ORDER — LOSARTAN POTASSIUM 50 MG/1
50 TABLET ORAL DAILY
Qty: 90 TABLET | Refills: 3 | Status: SHIPPED | OUTPATIENT
Start: 2021-05-03 | End: 2022-08-03

## 2021-05-03 ASSESSMENT — ENCOUNTER SYMPTOMS
ENDOCRINE NEGATIVE: 1
MUSCULOSKELETAL NEGATIVE: 1
GASTROINTESTINAL NEGATIVE: 1
RESPIRATORY NEGATIVE: 1
NEUROLOGICAL NEGATIVE: 1
CONSTITUTIONAL NEGATIVE: 1
CARDIOVASCULAR NEGATIVE: 1
HEMATOLOGIC/LYMPHATIC NEGATIVE: 1
EYES NEGATIVE: 1
PSYCHIATRIC NEGATIVE: 1

## 2021-05-03 NOTE — PROGRESS NOTES
"  Travon De La Torre is a 55 year old who presents for a hypertension follow-up. She has been taking losartan (Cozaar) 50 mg as directed for nearly a month now and she has been tolerating it well, without any side effects. She would like a 90 day refill of this medication. She reports that she has been active with exercise 4-5 days per week, and that her diet has been \"pretty good\". She has lost ~30 lbs with these lifestyle changes.    HPI     Hypertension Follow-up      Do you check your blood pressure regularly outside of the clinic? Yes     Are you following a low salt diet? Yes    Are your blood pressures ever more than 140 on the top number (systolic) OR more   than 90 on the bottom number (diastolic), for example 140/90? Sometimes above 140 but never above 90 \"130's/80\"      How many servings of fruits and vegetables do you eat daily?  2-3    On average, how many sweetened beverages do you drink each day (Examples: soda, juice, sweet tea, etc.  Do NOT count diet or artificially sweetened beverages)?   0    How many days per week do you exercise enough to make your heart beat faster? 4    How many minutes a day do you exercise enough to make your heart beat faster? 30 - 60    How many days per week do you miss taking your medication? 0        Review of Systems   Constitutional: Negative.    HENT: Negative.    Eyes: Negative.    Respiratory: Negative.    Cardiovascular: Negative.    Gastrointestinal: Negative.    Endocrine: Negative.    Breasts:  negative.    Genitourinary: Negative.    Musculoskeletal: Negative.    Neurological: Negative.    Hematological: Negative.    Psychiatric/Behavioral: Negative.              Objective    /82   Pulse 63   Temp 96.6  F (35.9  C) (Tympanic)   Resp 16   Wt 70 kg (154 lb 6.4 oz)   SpO2 100%   BMI 26.30 kg/m     There is no height or weight on file to calculate BMI.     Physical Exam  Constitutional:       Appearance: Normal appearance. She is normal weight. "   HENT:      Head: Normocephalic.   Eyes:      Pupils: Pupils are equal, round, and reactive to light.   Neck:      Musculoskeletal: Normal range of motion and neck supple.   Cardiovascular:      Rate and Rhythm: Normal rate and regular rhythm.      Pulses: Normal pulses.      Heart sounds: Normal heart sounds.   Pulmonary:      Effort: Pulmonary effort is normal.      Breath sounds: Normal breath sounds.   Abdominal:      General: Abdomen is flat.      Palpations: Abdomen is soft.   Musculoskeletal: Normal range of motion.   Skin:     General: Skin is warm and dry.   Neurological:      Mental Status: She is alert.   Psychiatric:         Mood and Affect: Mood normal.         Behavior: Behavior normal.            Plan/Assessment    1. Hypertension follow up; hypertension goal BP (blood pressure) < 140/90  -Continue taking losartan (Cozaar) 50 mg as directed. Refilled (90 days).  -Routine labs ordered: BMP and lipid panel  -Continue with the lifestyle changes (diet and regular exercise)    Britt Toure, KARINE student    I saw this patient in collaboration with Britt Toure  I was present with the APRN student who participated in the service and needed documentation of the services provided.  I have verified the history and personally performed the physical exam and medical decision making as documented by the student and edited by me.  Tyesha Silva MD

## 2021-05-03 NOTE — LETTER
May 5, 2021      Britt German  [id number]  [place] [address]        Dear ,    We are writing to inform you of your test results.      Your test results fall within the expected range(s) or within normal parameters except the LDL (bad cholesterol ) was slightly high. Recommend continued lifestyle changes.     Resulted Orders   Lipid panel reflex to direct LDL Fasting   Result Value Ref Range    Cholesterol 176 <200 mg/dL    Triglycerides 42 <150 mg/dL    HDL Cholesterol 62 >49 mg/dL    LDL Cholesterol Calculated 106 (H) <100 mg/dL      Comment:      Above desirable:  100-129 mg/dl  Borderline High:  130-159 mg/dL  High:             160-189 mg/dL  Very high:       >189 mg/dl      Non HDL Cholesterol 114 <130 mg/dL   Basic metabolic panel  (Ca, Cl, CO2, Creat, Gluc, K, Na, BUN)   Result Value Ref Range    Sodium 137 133 - 144 mmol/L    Potassium 4.1 3.4 - 5.3 mmol/L    Chloride 106 94 - 109 mmol/L    Carbon Dioxide 27 20 - 32 mmol/L    Anion Gap 4 3 - 14 mmol/L    Glucose 69 (L) 70 - 99 mg/dL    Urea Nitrogen 18 7 - 30 mg/dL    Creatinine 0.83 0.52 - 1.04 mg/dL    GFR Estimate 77 >60 mL/min/[1.73_m2]      Comment:      Non  GFR Calc  Starting 12/18/2018, serum creatinine based estimated GFR (eGFR) will be   calculated using the Chronic Kidney Disease Epidemiology Collaboration   (CKD-EPI) equation.      GFR Estimate If Black 90 >60 mL/min/[1.73_m2]      Comment:       GFR Calc  Starting 12/18/2018, serum creatinine based estimated GFR (eGFR) will be   calculated using the Chronic Kidney Disease Epidemiology Collaboration   (CKD-EPI) equation.      Calcium 9.6 8.5 - 10.1 mg/dL       If you have any questions or concerns, please call the clinic at the number listed above.       Sincerely,      Tyesha Silva MD/bmc

## 2021-05-05 NOTE — RESULT ENCOUNTER NOTE
Please inform patient that test result was within normal parameters except the LDL ( bad cholesterol ) was slightly high. Recommend that she continue with lifestyle changes.  Thank you.     Tyesha Silva M.D.

## 2021-05-30 ENCOUNTER — HEALTH MAINTENANCE LETTER (OUTPATIENT)
Age: 56
End: 2021-05-30

## 2021-06-02 ENCOUNTER — ALLIED HEALTH/NURSE VISIT (OUTPATIENT)
Dept: FAMILY MEDICINE | Facility: CLINIC | Age: 56
End: 2021-06-02
Payer: COMMERCIAL

## 2021-06-02 DIAGNOSIS — Z23 NEED FOR VACCINATION: Primary | ICD-10-CM

## 2021-06-02 PROCEDURE — 90471 IMMUNIZATION ADMIN: CPT

## 2021-06-02 PROCEDURE — 90472 IMMUNIZATION ADMIN EACH ADD: CPT

## 2021-06-02 PROCEDURE — 99207 PR NO CHARGE NURSE ONLY: CPT

## 2021-06-02 PROCEDURE — 90714 TD VACC NO PRESV 7 YRS+ IM: CPT

## 2021-06-02 PROCEDURE — 90750 HZV VACC RECOMBINANT IM: CPT

## 2021-06-02 NOTE — PROGRESS NOTES
Chief Complaint   Patient presents with     Imm/Inj     Td & Shingrix, pt states did not check with insurance but would like to get the shot today anyways.

## 2021-09-19 ENCOUNTER — HEALTH MAINTENANCE LETTER (OUTPATIENT)
Age: 56
End: 2021-09-19

## 2021-10-26 ENCOUNTER — ALLIED HEALTH/NURSE VISIT (OUTPATIENT)
Dept: FAMILY MEDICINE | Facility: CLINIC | Age: 56
End: 2021-10-26
Payer: COMMERCIAL

## 2021-10-26 DIAGNOSIS — Z23 NEED FOR VACCINATION: Primary | ICD-10-CM

## 2021-10-26 PROCEDURE — 90471 IMMUNIZATION ADMIN: CPT

## 2021-10-26 PROCEDURE — 90750 HZV VACC RECOMBINANT IM: CPT

## 2021-10-26 PROCEDURE — 99207 PR NO CHARGE NURSE ONLY: CPT

## 2021-10-26 NOTE — NURSING NOTE
Prior to immunization administration, verified patients identity using patient s name and date of birth. Please see Immunization Activity for additional information.     Screening Questionnaire for Adult Immunization    Are you sick today?   No   Do you have allergies to medications, food, a vaccine component or latex?   No   Have you ever had a serious reaction after receiving a vaccination?   No   Do you have a long-term health problem with heart, lung, kidney, or metabolic disease (e.g., diabetes), asthma, a blood disorder, no spleen, complement component deficiency, a cochlear implant, or a spinal fluid leak?  Are you on long-term aspirin therapy?   No   Do you have cancer, leukemia, HIV/AIDS, or any other immune system problem?   No   Do you have a parent, brother, or sister with an immune system problem?   No   In the past 3 months, have you taken medications that affect  your immune system, such as prednisone, other steroids, or anticancer drugs; drugs for the treatment of rheumatoid arthritis, Crohn s disease, or psoriasis; or have you had radiation treatments?   No   Have you had a seizure, or a brain or other nervous system problem?   No   During the past year, have you received a transfusion of blood or blood    products, or been given immune (gamma) globulin or antiviral drug?   No   For women: Are you pregnant or is there a chance you could become       pregnant during the next month?   No   Have you received any vaccinations in the past 4 weeks?   No     Immunization questionnaire answers were all negative.        Per orders of Dr. Matson, injection of SHINGRIX  given by Jacquelin Wilde CMA. Patient instructed to remain in clinic for 15 minutes afterwards, and to report any adverse reaction to me immediately.       Screening performed by Jacquelin Wilde CMA on 10/26/2021 at 9:21 AM.

## 2021-12-21 ENCOUNTER — NURSE TRIAGE (OUTPATIENT)
Dept: NURSING | Facility: CLINIC | Age: 56
End: 2021-12-21
Payer: COMMERCIAL

## 2021-12-22 ENCOUNTER — NURSE TRIAGE (OUTPATIENT)
Dept: NURSING | Facility: CLINIC | Age: 56
End: 2021-12-22
Payer: COMMERCIAL

## 2021-12-22 NOTE — TELEPHONE ENCOUNTER
Pt is calling.    Pt tested positive for COVID yesterday.  Body aches, chills, fever of 100.2 today. Temp up to 104 yesterday.  Mild occasional dry cough.  Shortness of breath, that is worsening over time, she stated is there even at rest, and worse with any activity.  She denies chest pain, chest tightness, wheezing.  Pt is vaccinated for COVID.  She is wondering if she would qualify for monoclonal antibody treatment.    Care advice reviewed. I advised her that she should be seen now for the shortness of breath.  Pt refuses. Does not want to sit in urgent care or ED.    I advised her that the monoclonal antibodies are done through the [place] [employer] [place], [place].  Web site given. I advised her that it is for people age 65 or up, or with pre-existing conditions.  I encouraged her to go on the web site and fill out the form to see if she qualifies.    I again encouraged her to be seen. May do virtual visit as well.  She still refuses.  Feels better today than yesterday. She stated that she will get a pulse oximeter to monitor her oxygen levels, and will go from there.  When to call back reviewed per care advice, such as with fever more than 72 hrs, worsening shortness of breath, chest pain, chest tightness, wheezing, fever of 105 or more  She verbalized understanding.      COVID 19 Nurse Triage Plan/Patient Instructions    Please be aware that novel coronavirus (COVID-19) may be circulating in the community. If you develop symptoms such as fever, cough, or SOB or if you have concerns about the presence of another infection including coronavirus (COVID-19), please contact your health care provider or visit [email or web address].     Disposition/Instructions    ED Visit recommended. Follow protocol based instructions.     Bring Your Own Device:  Please also bring your smart device(s) (smart phones, tablets, laptops) and their charging cables for your personal use and to communicate with your care team during your  visit.    Thank you for taking steps to prevent the spread of this virus.  o Limit your contact with others.  o Wear a simple mask to cover your cough.  o Wash your hands well and often.    Resources    [facility]: About COVID-19: [email or web address]    CDC: What to Do If You're Sick: www.cdc.gov/coronavirus/2019-ncov/about/steps-when-sick.html    CDC: Ending Home Isolation: www.cdc.gov/coronavirus/2019-ncov/hcp/disposition-in-home-patients.html     CDC: Caring for Someone: www.cdc.gov/coronavirus/2019-ncov/if-you-are-sick/care-for-someone.html     [facility]: Interim Guidance for Hospital Discharge to Home: [email or web address]/[email or web address]/[email or web address]    [facility] clinical trials (COVID-19 research studies): [email or web address]-clinical-trials     Below are the COVID-19 hotlines at the [place] Department of Health ([facility]). Interpreters are available.   o For health questions: Call 667-856-5807 or 1-152.324.9557 (7 a.m. to 7 p.m.)  o For questions about schools and childcare: Call 546-819-8356 or 1-500.859.7975 (7 a.m. to 7 p.m.)     Reason for Disposition    MILD difficulty breathing (e.g., minimal/no SOB at rest, SOB with walking, pulse <100)    Additional Information    Negative: SEVERE difficulty breathing (e.g., struggling for each breath, speaks in single words)    Negative: Difficult to awaken or acting confused (e.g., disoriented, slurred speech)    Negative: Bluish (or gray) lips or face now    Negative: Shock suspected (e.g., cold/pale/clammy skin, too weak to stand, low BP, rapid pulse)    Negative: Sounds like a life-threatening emergency to the triager    Negative: [1] COVID-19 exposure AND [2] no symptoms    Negative: COVID-19 vaccine reaction suspected (e.g., fever, headache, muscle aches) occurring 1 to 3 days after getting vaccine    Negative: COVID-19 vaccine, questions about    Negative: [1] Lives with someone known to have influenza (flu  test positive) AND [2] flu-like symptoms (e.g., cough, runny nose, sore throat, SOB; with or without fever)    Negative: [1] Adult with possible COVID-19 symptoms AND [2] triager concerned about severity of symptoms or other causes    Negative: COVID-19 and breastfeeding, questions about    Negative: SEVERE or constant chest pain or pressure (Exception: mild central chest pain, present only when coughing)    Negative: MODERATE difficulty breathing (e.g., speaks in phrases, SOB even at rest, pulse 100-120)    Negative: [1] Headache AND [2] stiff neck (can't touch chin to chest)    Protocols used: CORONAVIRUS (COVID-19) DIAGNOSED OR [id number] 8.25.2021    Fina Erickson RN  [facility] Nurse Advisor  12/22/2021 at 2:51 PM

## 2021-12-22 NOTE — TELEPHONE ENCOUNTER
This evening had intestinal cramps and diarrhea followed by myalgias, headache, fever, chills, mild cough. T 102.5 orally. Came on abruptly. Home test tonight positive for Covid. Denies SOB or chest pain. /69. Rec'd 2 doses of vaccine. No known covid + contacts. Advised home care.     Reason for Disposition    [1] COVID-19 diagnosed by positive lab test AND [2] mild symptoms (e.g., cough, fever, others) AND [3] no complications or SOB    Additional Information    Negative: SEVERE difficulty breathing (e.g., struggling for each breath, speaks in single words)    Negative: Difficult to awaken or acting confused (e.g., disoriented, slurred speech)    Negative: Bluish (or gray) lips or face now    Negative: Shock suspected (e.g., cold/pale/clammy skin, too weak to stand, low BP, rapid pulse)    Negative: Sounds like a life-threatening emergency to the triager    Negative: [1] COVID-19 exposure AND [2] no symptoms    Negative: COVID-19 vaccine reaction suspected (e.g., fever, headache, muscle aches) occurring 1 to 3 days after getting vaccine    Negative: COVID-19 vaccine, questions about    Negative: [1] Lives with someone known to have influenza (flu test positive) AND [2] flu-like symptoms (e.g., cough, runny nose, sore throat, SOB; with or without fever)    Negative: [1] Adult with possible COVID-19 symptoms AND [2] triager concerned about severity of symptoms or other causes    Negative: COVID-19 and breastfeeding, questions about    Negative: SEVERE or constant chest pain or pressure (Exception: mild central chest pain, present only when coughing)    Negative: MODERATE difficulty breathing (e.g., speaks in phrases, SOB even at rest, pulse 100-120)    Negative: [1] Headache AND [2] stiff neck (can't touch chin to chest)    Negative: MILD difficulty breathing (e.g., minimal/no SOB at rest, SOB with walking, pulse <100)    Negative: Chest pain or pressure    Negative: Patient sounds very sick or weak to the  triager    Negative: Fever > 103 F (39.4 C)    Negative: [1] Fever > 101 F (38.3 C) AND [2] age > 60 years    Negative: [1] Fever > 100.0 F (37.8 C) AND [2] bedridden (e.g., nursing home patient, CVA, chronic illness, recovering from surgery)    Negative: HIGH RISK for severe COVID complications (e.g., age > 64 years, obesity with BMI > 25, pregnant, chronic lung disease or other chronic medical condition)  (Exception: Already seen by PCP and no new or worsening symptoms.)    Negative: [1] HIGH RISK patient AND [2] influenza is widespread in the community AND [3] ONE OR MORE respiratory symptoms: cough, sore throat, runny or stuffy nose    Negative: [1] HIGH RISK patient AND [2] influenza exposure within the last 7 days AND [3] ONE OR MORE respiratory symptoms: cough, sore throat, runny or stuffy nose    Negative: [1] COVID-19 infection suspected by caller or triager AND [2] mild symptoms (cough, fever, or others) AND [3] negative COVID-19 rapid test    Negative: Fever present > 3 days (72 hours)    Negative: [1] Fever returns after gone for over 24 hours AND [2] symptoms worse or not improved    Negative: [1] Continuous (nonstop) coughing interferes with work or school AND [2] no improvement using cough treatment per protocol    Negative: Cough present > 3 weeks    Negative: [1] COVID-19 diagnosed by positive lab test AND [2] NO symptoms (e.g., cough, fever, others)    Protocols used: CORONAVIRUS (COVID-19) DIAGNOSED OR [id number] 8.25.2021

## 2022-01-09 ENCOUNTER — HEALTH MAINTENANCE LETTER (OUTPATIENT)
Age: 57
End: 2022-01-09

## 2022-02-25 ENCOUNTER — HOSPITAL ENCOUNTER (OUTPATIENT)
Dept: MAMMOGRAPHY | Facility: CLINIC | Age: 57
Discharge: HOME | End: 2022-02-25
Attending: FAMILY MEDICINE | Admitting: FAMILY MEDICINE
Payer: COMMERCIAL

## 2022-02-25 DIAGNOSIS — Z12.31 VISIT FOR SCREENING MAMMOGRAM: ICD-10-CM

## 2022-02-25 PROCEDURE — 77067 SCR MAMMO BI INCL CAD: CPT

## 2022-03-16 ENCOUNTER — IMMUNIZATION (OUTPATIENT)
Dept: FAMILY MEDICINE | Facility: CLINIC | Age: 57
End: 2022-03-16
Payer: COMMERCIAL

## 2022-03-16 ENCOUNTER — OFFICE VISIT (OUTPATIENT)
Dept: DERMATOLOGY | Facility: CLINIC | Age: 57
End: 2022-03-16
Payer: COMMERCIAL

## 2022-03-16 VITALS — DIASTOLIC BLOOD PRESSURE: 88 MMHG | OXYGEN SATURATION: 93 % | HEART RATE: 60 BPM | SYSTOLIC BLOOD PRESSURE: 145 MMHG

## 2022-03-16 DIAGNOSIS — L82.1 SEBORRHEIC KERATOSIS: ICD-10-CM

## 2022-03-16 DIAGNOSIS — L91.8 SKIN TAG: Primary | ICD-10-CM

## 2022-03-16 DIAGNOSIS — D23.9 DERMAL NEVUS: ICD-10-CM

## 2022-03-16 DIAGNOSIS — L81.4 LENTIGO: ICD-10-CM

## 2022-03-16 DIAGNOSIS — D22.9 NEVUS: ICD-10-CM

## 2022-03-16 DIAGNOSIS — D18.01 ANGIOMA OF SKIN: ICD-10-CM

## 2022-03-16 DIAGNOSIS — D23.9 DERMATOFIBROMA: ICD-10-CM

## 2022-03-16 PROCEDURE — 99203 OFFICE O/P NEW LOW 30 MIN: CPT | Mod: 25 | Performed by: DERMATOLOGY

## 2022-03-16 PROCEDURE — 0054A COVID-19,PF,PFIZER (12+ YRS): CPT

## 2022-03-16 PROCEDURE — 11200 RMVL SKIN TAGS UP TO&INC 15: CPT | Performed by: DERMATOLOGY

## 2022-03-16 PROCEDURE — 91305 COVID-19,PF,PFIZER (12+ YRS): CPT

## 2022-03-16 NOTE — PROGRESS NOTES
Britt German is an extremely pleasant 56 year old year old female patient here today for spot on left thigh.   .   Patient states this has been present for a while.  Patient reports the following symptoms:  none.  Patient reports the following previous treatments none.  These treatments did not work.  Patient reports the following modifying factors none.  Associated symptoms: tags on neck  .  Patient has no other skin complaints today.  Remainder of the HPI, Meds, PMH, Allergies, FH, and SH was reviewed in chart.      Past Medical History:   Diagnosis Date     Hypertension      Obstructive sleep apnea      Recurrent UTI        Past Surgical History:   Procedure Laterality Date     ABDOMINOPLASTY      [facility]     ARTHROSCOPY ANKLE Left 2016    Procedure: ARTHROSCOPY ANKLE;  Surgeon: Zac Cuello DPM;  Location: [facility]     BIOPSY BREAST       C/SECTION, LOW TRANSVERSE  ,     , Low Transverse     CL AFF SURGICAL PATHOLOGY  ,     Breast reduction     D & C       DILATION AND CURETTAGE, HYSTEROSCOPY, ABLATE ENDOMETRIUM NOVASURE, COMBINED  2014    Procedure: COMBINED DILATION AND CURETTAGE, HYSTEROSCOPY, ABLATE ENDOMETRIUM NOVASURE;  Surgeon: Rachel Hernandez DO;  Location: [facility]     INCISION AND DRAINAGE LOWER EXTREMITY, COMBINED  2011    COMBINED INCISION AND DRAINAGE LOWER EXTREMITY performed by LEY, JEFFREY DUANE at [facility]     OPEN REDUCTION INTERNAL FIXATION ANKLE Left 2015    Procedure: OPEN REDUCTION INTERNAL FIXATION ANKLE;  Surgeon: Zac Cuello DPM;  Location: [facility]     REMOVE HARDWARE ANKLE  2011    REMOVE HARDWARE ANKLE performed by LEY, JEFFREY DUANE at [facility]     REMOVE HARDWARE ANKLE Left 2016    Procedure: REMOVE HARDWARE ANKLE;  Surgeon: Zac Cuello DPM;  Location: [facility]     SURGICAL HISTORY OF -       Right ankle fracture, ORIF     TUBAL LIGATION          Family History   Problem Relation Age of Onset      Breast Cancer Mother         dx age 38     Diabetes Sister      Cardiovascular Father         CHF     Respiratory Father         COPD     Cardiovascular Son         aortic stenosis     Diabetes Sister        Social History     Socioeconomic History     Marital status:      Spouse name: Not on file     Number of children: 2     Years of education: Not on file     Highest education level: Not on file   Occupational History     Occupation: [employer]     Employer: [employer]   Tobacco Use     Smoking status: Never Smoker     Smokeless tobacco: Never Used   Substance and Sexual Activity     Alcohol use: Yes     Comment: 2-3 times weekly     Drug use: No     Sexual activity: Yes     Partners: Male     Birth control/protection: None     Comment: tubal ligation 1994   Other Topics Concern     Parent/sibling w/ CABG, MI or angioplasty before 65F 55M? No   Social History Narrative     Not on file     Social Determinants of Health     Financial Resource Strain: Not on file   Food Insecurity: Not on file   Transportation Needs: Not on file   Physical Activity: Not on file   Stress: Not on file   Social Connections: Not on file   Intimate Partner Violence: Not on file   Housing Stability: Not on file       Outpatient Encounter Medications as of 3/16/2022   Medication Sig Dispense Refill     losartan (COZAAR) 50 MG tablet Take 1 tablet (50 mg) by mouth daily 90 tablet 3     No facility-administered encounter medications on file as of 3/16/2022.             O:   NAD, WDWN, Alert & Oriented, Mood & Affect wnl, Vitals stable   Here today alone   BP (!) 145/88 (BP Location: Left arm, Patient Position: Sitting, Cuff Size: Adult Regular)   Pulse 60   SpO2 93%    General appearance normal   Vitals stable   Alert, oriented and in no acute distress      Following lymph nodes palpated: Occipital, Cervical, Supraclavicular no lad  L thigh red papule   Pigmented macule son trunk and ext with regular borders and  pigment netowkrs  Tags on neck and axilla      Stuck on papules and brown macules on trunk and ext   Red papules on trunk  Flesh colored papules on trunk   r thigh firm papule   The remainder of the full exam was normal; the following areas were examined:  conjunctiva/lids, oral mucosa, neck, peripheral vascular system, abdomen, lymph nodes, digits/nails, eccrine and apocrine glands, scalp/hair, face, neck, chest, abdomen, buttocks, back, RUE, LUE, RLE, LLE       Eyes: Conjunctivae/lids:Normal     ENT: Lips, buccal mucosa, tongue: normal    MSK:Normal    Cardiovascular: peripheral edema none    Pulm: Breathing Normal    Lymph Nodes: No Head and Neck Lymphadenopathy     Neuro/Psych: Orientation:Alert and Orientedx3 ; Mood/Affect:normal       A/P:  1. Seborrheic keratosis, lentigo, angioma, dermal nevus, nevi, dermatofibroma  2. Tags  R neck and R axilla  LN2:  Treated with LN2 for 5s for 1-2 cycles. Warned risks of blistering, pain, pigment change, scarring, and incomplete resolution.  Advised patient to return if lesions do not completely resolve.  Wound care sheet given.  It was a pleasure speaking to Britt German today.  Previous clinic notes and pertinent laboratory tests were reviewed prior to Britt German's visit.    Nature and genetics of benign skin lesions dicussed with patient.  Signs and Symptoms of skin cancer discussed with patient.  Patient encouraged to perform monthly skin exams.  UV precautions reviewed with patient.  Risks of non-melanoma skin cancer discussed with patient   Return to clinic 12 months

## 2022-03-16 NOTE — NURSING NOTE
Chief Complaint   Patient presents with     Derm Problem     skin check        Corine Sherwood on 3/16/2022 at 9:59 AM

## 2022-03-16 NOTE — LETTER
3/16/2022         RE: Britt German  [id number] [id number]        Dear Colleague,    Thank you for referring your patient, Britt German, to the [facility]. Please see a copy of my visit note below.    Britt German is an extremely pleasant 56 year old year old female patient here today for spot on left thigh.   .   Patient states this has been present for a while.  Patient reports the following symptoms:  none.  Patient reports the following previous treatments none.  These treatments did not work.  Patient reports the following modifying factors none.  Associated symptoms: tags on neck  .  Patient has no other skin complaints today.  Remainder of the HPI, Meds, PMH, Allergies, FH, and SH was reviewed in chart.      Past Medical History:   Diagnosis Date     Hypertension      Obstructive sleep apnea      Recurrent UTI        Past Surgical History:   Procedure Laterality Date     ABDOMINOPLASTY      [facility]     ARTHROSCOPY ANKLE Left 2016    Procedure: ARTHROSCOPY ANKLE;  Surgeon: Zac Cuello DPM;  Location: [facility]     BIOPSY BREAST       C/SECTION, LOW TRANSVERSE  ,     , Low Transverse     CL AFF SURGICAL PATHOLOGY  ,     Breast reduction     D & C       DILATION AND CURETTAGE, HYSTEROSCOPY, ABLATE ENDOMETRIUM NOVASURE, COMBINED  2014    Procedure: COMBINED DILATION AND CURETTAGE, HYSTEROSCOPY, ABLATE ENDOMETRIUM NOVASURE;  Surgeon: Rachel Hernandez DO;  Location: [facility]     INCISION AND DRAINAGE LOWER EXTREMITY, COMBINED  2011    COMBINED INCISION AND DRAINAGE LOWER EXTREMITY performed by LEY, JEFFREY DUANE at [facility]     OPEN REDUCTION INTERNAL FIXATION ANKLE Left 2015    Procedure: OPEN REDUCTION INTERNAL FIXATION ANKLE;  Surgeon: Zac Cuello DPM;  Location: [facility]     REMOVE HARDWARE ANKLE  2011    REMOVE HARDWARE ANKLE performed by LEY, JEFFREY DUANE at [facility]     REMOVE  HARDWARE ANKLE Left 9/1/2016    Procedure: REMOVE HARDWARE ANKLE;  Surgeon: Zac Cuello DPM;  Location: [facility]     SURGICAL HISTORY OF -   2006    Right ankle fracture, ORIF     TUBAL LIGATION  1994        Family History   Problem Relation Age of Onset     Breast Cancer Mother         dx age 38     Diabetes Sister      Cardiovascular Father         CHF     Respiratory Father         COPD     Cardiovascular Son         aortic stenosis     Diabetes Sister        Social History     Socioeconomic History     Marital status:      Spouse name: Not on file     Number of children: 2     Years of education: Not on file     Highest education level: Not on file   Occupational History     Occupation: [employer]     Employer: [employer]   Tobacco Use     Smoking status: Never Smoker     Smokeless tobacco: Never Used   Substance and Sexual Activity     Alcohol use: Yes     Comment: 2-3 times weekly     Drug use: No     Sexual activity: Yes     Partners: Male     Birth control/protection: None     Comment: tubal ligation 1994   Other Topics Concern     Parent/sibling w/ CABG, MI or angioplasty before 65F 55M? No   Social History Narrative     Not on file     Social Determinants of Health     Financial Resource Strain: Not on file   Food Insecurity: Not on file   Transportation Needs: Not on file   Physical Activity: Not on file   Stress: Not on file   Social Connections: Not on file   Intimate Partner Violence: Not on file   Housing Stability: Not on file       Outpatient Encounter Medications as of 3/16/2022   Medication Sig Dispense Refill     losartan (COZAAR) 50 MG tablet Take 1 tablet (50 mg) by mouth daily 90 tablet 3     No facility-administered encounter medications on file as of 3/16/2022.             O:   NAD, WDWN, Alert & Oriented, Mood & Affect wnl, Vitals stable   Here today alone   BP (!) 145/88 (BP Location: Left arm, Patient Position: Sitting, Cuff Size: Adult Regular)   Pulse 60    SpO2 93%    General appearance normal   Vitals stable   Alert, oriented and in no acute distress      Following lymph nodes palpated: Occipital, Cervical, Supraclavicular no lad  L thigh red papule   Pigmented macule son trunk and ext with regular borders and pigment netowkrs  Tags on neck and axilla      Stuck on papules and brown macules on trunk and ext   Red papules on trunk  Flesh colored papules on trunk   r thigh firm papule   The remainder of the full exam was normal; the following areas were examined:  conjunctiva/lids, oral mucosa, neck, peripheral vascular system, abdomen, lymph nodes, digits/nails, eccrine and apocrine glands, scalp/hair, face, neck, chest, abdomen, buttocks, back, RUE, LUE, RLE, LLE       Eyes: Conjunctivae/lids:Normal     ENT: Lips, buccal mucosa, tongue: normal    MSK:Normal    Cardiovascular: peripheral edema none    Pulm: Breathing Normal    Lymph Nodes: No Head and Neck Lymphadenopathy     Neuro/Psych: Orientation:Alert and Orientedx3 ; Mood/Affect:normal       A/P:  1. Seborrheic keratosis, lentigo, angioma, dermal nevus, nevi, dermatofibroma  2. Tags  R neck and R axilla  LN2:  Treated with LN2 for 5s for 1-2 cycles. Warned risks of blistering, pain, pigment change, scarring, and incomplete resolution.  Advised patient to return if lesions do not completely resolve.  Wound care sheet given.  It was a pleasure speaking to Britt German today.  Previous clinic notes and pertinent laboratory tests were reviewed prior to Britt German's visit.    Nature and genetics of benign skin lesions dicussed with patient.  Signs and Symptoms of skin cancer discussed with patient.  Patient encouraged to perform monthly skin exams.  UV precautions reviewed with patient.  Risks of non-melanoma skin cancer discussed with patient   Return to clinic 12 months        Again, thank you for allowing me to participate in the care of your patient.        Sincerely,        Harpal Harkins MD

## 2022-07-14 ENCOUNTER — TELEPHONE (OUTPATIENT)
Dept: FAMILY MEDICINE | Facility: CLINIC | Age: 57
End: 2022-07-14

## 2022-07-14 NOTE — TELEPHONE ENCOUNTER
Attempted to contact patient for further triage of symptoms, no answer. Message left to return call to clinic.    Teresa Dior RN  [facility]

## 2022-07-14 NOTE — TELEPHONE ENCOUNTER
Reason for call:    Symptom or request:     Patient called requesting to have an appt concerns about bronchitis vs pneumonia. X 5days      Best Time:   any    Can we leave a detailed message on this number? yes     Sayda MONTEIRO  Station

## 2022-07-15 ENCOUNTER — HOSPITAL ENCOUNTER (EMERGENCY)
Facility: CLINIC | Age: 57
End: 2022-07-15
Payer: COMMERCIAL

## 2022-07-15 NOTE — TELEPHONE ENCOUNTER
Pt was called & states her sx started Mark 7/10. States sx better today, no fever. Temp max around 100. States has non-prod cough & chest congestion, hard to take deep breath but better today than yesterday. Feels run-down. Took home covid test yesterday & was negative. Took steam bath last night that seemed to help loosen cough. Has not taken any OTC meds.  Has hx sleep apnea & uses CPAP.   ? Bronchitis or pneumonia.  No appt's avail today. Pt advised . Verbalized understanding.    Jacquelin Jones RN

## 2022-08-03 DIAGNOSIS — I10 HYPERTENSION GOAL BP (BLOOD PRESSURE) < 140/90: ICD-10-CM

## 2022-08-03 RX ORDER — LOSARTAN POTASSIUM 50 MG/1
50 TABLET ORAL DAILY
Qty: 90 TABLET | Refills: 3 | Status: SHIPPED | OUTPATIENT
Start: 2022-08-03 | End: 2023-10-03

## 2022-08-03 NOTE — TELEPHONE ENCOUNTER
"Requested Prescriptions   Pending Prescriptions Disp Refills     losartan (COZAAR) 50 MG tablet [Pharmacy Med Name: LOSARTAN POTASSIUM 50MG TABS] 90 tablet 3     Sig: TAKE 1 TABLET (50 MG) BY MOUTH DAILY       Angiotensin-II Receptors Failed - 8/3/2022  7:26 AM        Failed - Last blood pressure under 140/90 in past 12 months     BP Readings from Last 3 Encounters:   03/16/22 (!) 145/88   05/03/21 126/82   09/01/20 (!) 138/102                 Failed - Normal serum creatinine on file in past 12 months     Recent Labs   Lab Test 05/03/21  1101   CR 0.83       Ok to refill medication if creatinine is low          Failed - Normal serum potassium on file in past 12 months     Recent Labs   Lab Test 05/03/21  1101   POTASSIUM 4.1                    Passed - Recent (12 mo) or future (30 days) visit within the authorizing provider's specialty     Patient has had an office visit with the authorizing provider or a provider within the authorizing providers department within the previous 12 mos or has a future within next 30 days. See \"Patient Info\" tab in inbasket, or \"Choose Columns\" in Meds & Orders section of the refill encounter.              Passed - Medication is active on med list        Passed - Patient is age 18 or older        Passed - No active pregnancy on record        Passed - No positive pregnancy test in past 12 months             "

## 2022-11-21 ENCOUNTER — HEALTH MAINTENANCE LETTER (OUTPATIENT)
Age: 57
End: 2022-11-21

## 2023-03-07 ENCOUNTER — HOSPITAL ENCOUNTER (OUTPATIENT)
Dept: MAMMOGRAPHY | Facility: CLINIC | Age: 58
Discharge: HOME | End: 2023-03-07
Attending: FAMILY MEDICINE | Admitting: FAMILY MEDICINE
Payer: COMMERCIAL

## 2023-03-07 DIAGNOSIS — Z12.31 VISIT FOR SCREENING MAMMOGRAM: ICD-10-CM

## 2023-03-07 PROCEDURE — 77067 SCR MAMMO BI INCL CAD: CPT

## 2023-04-16 ENCOUNTER — HEALTH MAINTENANCE LETTER (OUTPATIENT)
Age: 58
End: 2023-04-16

## 2023-10-03 DIAGNOSIS — I10 HYPERTENSION GOAL BP (BLOOD PRESSURE) < 140/90: ICD-10-CM

## 2023-10-04 RX ORDER — LOSARTAN POTASSIUM 50 MG/1
50 TABLET ORAL DAILY
Qty: 90 TABLET | Refills: 0 | Status: SHIPPED | OUTPATIENT
Start: 2023-10-04 | End: 2024-01-09

## 2023-11-06 ENCOUNTER — HOSPITAL ENCOUNTER (EMERGENCY)
Facility: CLINIC | Age: 58
Discharge: HOME | End: 2023-11-06
Payer: COMMERCIAL

## 2023-11-06 VITALS
RESPIRATION RATE: 20 BRPM | SYSTOLIC BLOOD PRESSURE: 166 MMHG | OXYGEN SATURATION: 96 % | HEART RATE: 82 BPM | TEMPERATURE: 98.1 F | DIASTOLIC BLOOD PRESSURE: 88 MMHG

## 2023-11-06 DIAGNOSIS — H10.9 INFECTIVE CONJUNCTIVITIS: ICD-10-CM

## 2023-11-06 PROCEDURE — 99283 EMERGENCY DEPT VISIT LOW MDM: CPT

## 2023-11-06 RX ORDER — POLYMYXIN B SULFATE AND TRIMETHOPRIM 1; 10000 MG/ML; [USP'U]/ML
1-2 SOLUTION OPHTHALMIC
Qty: 10 ML | Refills: 0 | Status: SHIPPED | OUTPATIENT
Start: 2023-11-06 | End: 2024-03-20

## 2023-11-06 ASSESSMENT — ENCOUNTER SYMPTOMS
EYE ITCHING: 1
PHOTOPHOBIA: 0
EYE DISCHARGE: 1
EYE REDNESS: 1

## 2023-11-06 ASSESSMENT — VISUAL ACUITY
OS: 20/50
OD: 20/30

## 2023-11-06 NOTE — DISCHARGE INSTRUCTIONS
Eye drops to both eyes as prescribed every 4 hours while awake. Return if not improving or for worsening symptoms or follow-up with eye clinic.

## 2023-11-07 NOTE — ED PROVIDER NOTES
History     Chief Complaint   Patient presents with    Conjunctivitis     HPI  Britt German is a 58 year old female who presents urgent care for concern of pinkeye.  Patient reports onset of right eye redness and discharge a few days ago.  Symptoms spread to the right eye the next day as well.  Patient states she has been doing warm compresses as well as over-the-counter eyedrops without any significant relief in her symptoms prompting her visit today.  Patient does report some intermittent blurriness of the eyes which can be relieved with blinking.  She does report a thick yellow-colored discharge from the bilateral eyes and states that yesterday she had to clear her eyes at least once an hour due to the discharge.  She does endorse some bilateral generalized eye discomfort.  No increased pain with eye movements.  Denies sensation of something being in the eyes at this time.  No additional concerns at this time.  Patient does not wear contacts.    Allergies:  Allergies   Allergen Reactions    Hydrochlorothiazide Rash    Lisinopril      Hives       Problem List:    Patient Active Problem List    Diagnosis Date Noted    Hypertension goal BP (blood pressure) < 140/90 03/12/2013     Priority: High    LULÚ (obstructive sleep apnea) 05/23/2013     Priority: Medium     Severe LULÚ (5/21/2013 with AHI 43, angel desat 85%, CPAP 10 effective and included supine REM)      Indeterminate pulmonary nodules 04/09/2013     Priority: Medium     CT angio chest showing  (4/9/13):   Scattered small indeterminate pulmonary nodules in both lower lungs, with the largest measuring 0.5 cm on the left. Followup CT in 6-12 months is recommended ( 10/9/2013- 4/9/2013) .            Hyperlipidemia with target LDL less than 160 10/31/2010     Priority: Medium     Diagnosis updated by automated process. Provider to review and confirm.      Family history of breast cancer in mother 05/26/2009     Priority: Medium     Yearly MMG      Recurrent  UTI      Priority: Low    Tubal ligation status 2009     Priority: Low        Past Medical History:    Past Medical History:   Diagnosis Date    Hypertension     Obstructive sleep apnea     Recurrent UTI        Past Surgical History:    Past Surgical History:   Procedure Laterality Date    ABDOMINOPLASTY      [facility]    ARTHROSCOPY ANKLE Left 2016    Procedure: ARTHROSCOPY ANKLE;  Surgeon: Zac Cuello DPM;  Location: [facility]    BIOPSY BREAST      C/SECTION, LOW TRANSVERSE  ,     , Low Transverse    CL AFF SURGICAL PATHOLOGY  ,     Breast reduction    D & C      DILATION AND CURETTAGE, HYSTEROSCOPY, ABLATE ENDOMETRIUM NOVASURE, COMBINED  2014    Procedure: COMBINED DILATION AND CURETTAGE, HYSTEROSCOPY, ABLATE ENDOMETRIUM NOVASURE;  Surgeon: Rachel Hernandez DO;  Location: [facility]    INCISION AND DRAINAGE LOWER EXTREMITY, COMBINED  2011    COMBINED INCISION AND DRAINAGE LOWER EXTREMITY performed by LEY, JEFFREY DUANE at [facility]    MAMMOPLASTY REDUCTION      OPEN REDUCTION INTERNAL FIXATION ANKLE Left 2015    Procedure: OPEN REDUCTION INTERNAL FIXATION ANKLE;  Surgeon: Zac Cuello DPM;  Location: [facility]    REMOVE HARDWARE ANKLE  2011    REMOVE HARDWARE ANKLE performed by LEY, JEFFREY DUANE at [facility]    REMOVE HARDWARE ANKLE Left 2016    Procedure: REMOVE HARDWARE ANKLE;  Surgeon: Zac Cuello DPM;  Location: [facility]    SURGICAL HISTORY OF -       Right ankle fracture, ORIF    TUBAL LIGATION         Family History:    Family History   Problem Relation Age of Onset    Breast Cancer Mother         dx age 38    Diabetes Sister     Cardiovascular Father         CHF    Respiratory Father         COPD    Cardiovascular Son         aortic stenosis    Diabetes Sister        Social History:  Marital Status:   [2]  Social History     Tobacco Use    Smoking status: Never    Smokeless tobacco: Never   Substance Use  Topics    Alcohol use: Yes     Comment: 2-3 times weekly    Drug use: No        Medications:    trimethoprim-polymyxin b (POLYTRIM) 94328-2.1 UNIT/ML-% ophthalmic solution  losartan (COZAAR) 50 MG tablet          Review of Systems   Eyes:  Positive for discharge, redness and itching. Negative for photophobia and visual disturbance.   All other systems reviewed and are negative.      Physical Exam   BP: (!) 166/88  Pulse: 82  Temp: 98.1  F (36.7  C)  Resp: 20  SpO2: 96 %      Physical Exam  Constitutional:       General: She is not in acute distress.     Appearance: Normal appearance. She is well-developed.   HENT:      Head: Normocephalic and atraumatic.   Eyes:      General: No scleral icterus.     Extraocular Movements: Extraocular movements intact.      Right eye: Normal extraocular motion.      Left eye: Normal extraocular motion.      Conjunctiva/sclera:      Right eye: Right conjunctiva is injected. Exudate present. No chemosis or hemorrhage.     Left eye: Left conjunctiva is injected. Exudate present. No chemosis or hemorrhage.  Cardiovascular:      Rate and Rhythm: Normal rate.   Pulmonary:      Effort: Pulmonary effort is normal. No respiratory distress.   Abdominal:      General: Abdomen is flat.   Musculoskeletal:      Cervical back: Normal range of motion and neck supple.   Skin:     General: Skin is warm and dry.      Findings: No rash.   Neurological:      Mental Status: She is alert and oriented to person, place, and time.         ED Course                 Procedures             No results found for this or any previous visit (from the past 24 hour(s)).    Medications - No data to display    Assessments & Plan (with Medical Decision Making)   Patient presented to urgent care for concern of eye discharge and redness.  She is afebrile on arrival vital signs otherwise reassuring.  There is no surrounding periorbital erythema concerning for a preseptal cellulitis.  Patient has normal eye movements and  vision is grossly intact.  Snellen chart is within normal limits.  Normal extraocular eye movements.  Patient is noted to have discharge from the bilateral eyes with the left greater than the right.  History and physical as above are consistent with bacterial conjunctivitis bilaterally.  Polytrim for the next 7 days.  Advised to return for new or worsening symptoms or to follow-up with ophthalmology for new or worsening symptoms.  The patient was discharged in good condition and is agreeable to the above plan.  I have reviewed the nursing notes.    I have reviewed the findings, diagnosis, plan and need for follow up with the patient.          Discharge Medication List as of 11/6/2023  3:43 PM        START taking these medications    Details   trimethoprim-polymyxin b (POLYTRIM) 51662-5.1 UNIT/ML-% ophthalmic solution Place 1-2 drops into both eyes every 4 hours (while awake), Disp-10 mL, R-0, E-Prescribe             Final diagnoses:   Infective conjunctivitis       11/6/2023   [facility] EMERGENCY DEPT       Bill Tao APRN CNP  11/06/23 [id number]

## 2023-11-26 ENCOUNTER — HEALTH MAINTENANCE LETTER (OUTPATIENT)
Age: 58
End: 2023-11-26

## 2023-12-28 ENCOUNTER — MYC REFILL (OUTPATIENT)
Dept: FAMILY MEDICINE | Facility: CLINIC | Age: 58
End: 2023-12-28
Payer: COMMERCIAL

## 2023-12-28 DIAGNOSIS — I10 HYPERTENSION GOAL BP (BLOOD PRESSURE) < 140/90: ICD-10-CM

## 2023-12-28 RX ORDER — LOSARTAN POTASSIUM 50 MG/1
50 TABLET ORAL DAILY
Qty: 90 TABLET | Refills: 0 | OUTPATIENT
Start: 2023-12-28

## 2024-01-09 ENCOUNTER — OFFICE VISIT (OUTPATIENT)
Dept: FAMILY MEDICINE | Facility: CLINIC | Age: 59
End: 2024-01-09
Payer: COMMERCIAL

## 2024-01-09 VITALS
RESPIRATION RATE: 12 BRPM | DIASTOLIC BLOOD PRESSURE: 102 MMHG | OXYGEN SATURATION: 96 % | HEIGHT: 65 IN | SYSTOLIC BLOOD PRESSURE: 150 MMHG | BODY MASS INDEX: 26.09 KG/M2 | HEART RATE: 80 BPM | TEMPERATURE: 97.4 F

## 2024-01-09 DIAGNOSIS — Z12.11 SPECIAL SCREENING FOR MALIGNANT NEOPLASMS, COLON: ICD-10-CM

## 2024-01-09 DIAGNOSIS — Z82.49 FAMILY HISTORY OF CARDIOVASCULAR DISEASE: ICD-10-CM

## 2024-01-09 DIAGNOSIS — I10 UNCONTROLLED STAGE 2 HYPERTENSION: Primary | ICD-10-CM

## 2024-01-09 DIAGNOSIS — E78.5 HYPERLIPIDEMIA WITH TARGET LDL LESS THAN 160: ICD-10-CM

## 2024-01-09 DIAGNOSIS — Z80.3 FAMILY HISTORY OF BREAST CANCER IN MOTHER: ICD-10-CM

## 2024-01-09 DIAGNOSIS — G47.33 OSA (OBSTRUCTIVE SLEEP APNEA): ICD-10-CM

## 2024-01-09 DIAGNOSIS — I10 HYPERTENSION GOAL BP (BLOOD PRESSURE) < 140/90: ICD-10-CM

## 2024-01-09 PROCEDURE — 90471 IMMUNIZATION ADMIN: CPT | Performed by: NURSE PRACTITIONER

## 2024-01-09 PROCEDURE — 91320 SARSCV2 VAC 30MCG TRS-SUC IM: CPT | Performed by: NURSE PRACTITIONER

## 2024-01-09 PROCEDURE — 90480 ADMN SARSCOV2 VAC 1/ONLY CMP: CPT | Performed by: NURSE PRACTITIONER

## 2024-01-09 PROCEDURE — 90678 RSV VACC PREF BIVALENT IM: CPT | Performed by: NURSE PRACTITIONER

## 2024-01-09 PROCEDURE — 90472 IMMUNIZATION ADMIN EACH ADD: CPT | Performed by: NURSE PRACTITIONER

## 2024-01-09 PROCEDURE — 90686 IIV4 VACC NO PRSV 0.5 ML IM: CPT | Performed by: NURSE PRACTITIONER

## 2024-01-09 PROCEDURE — 99214 OFFICE O/P EST MOD 30 MIN: CPT | Mod: 25 | Performed by: NURSE PRACTITIONER

## 2024-01-09 RX ORDER — LOSARTAN POTASSIUM 50 MG/1
50 TABLET ORAL DAILY
Qty: 90 TABLET | Refills: 0 | Status: CANCELLED | OUTPATIENT
Start: 2024-01-09

## 2024-01-09 RX ORDER — LOSARTAN POTASSIUM 100 MG/1
100 TABLET ORAL DAILY
Qty: 90 TABLET | Refills: 1 | Status: SHIPPED | OUTPATIENT
Start: 2024-01-09 | End: 2024-03-12

## 2024-01-09 ASSESSMENT — PAIN SCALES - GENERAL: PAINLEVEL: NO PAIN (0)

## 2024-01-09 NOTE — PATIENT INSTRUCTIONS
Increase Losartan to 100 mg once daily.    Continue to monitor blood pressure at home - goal < 140/90.    Continue low salt.   Follow-up with RN in 2-3 weeks.    Labs today.  CT coronary calcium testing - ordered.    Colonoscopy order.    Rachel Collins DNP

## 2024-01-09 NOTE — PROGRESS NOTES
Assessment & Plan     Uncontrolled stage 2 hypertension  Increased Losartan to 100 mg once daily. Recheck blood pressure in 2-3 weeks.  Advised DASH diet and continued daily activity.    - CT Coronary Calcium Scan    Hypertension goal BP (blood pressure) < 140/90  See above.  Recheck BMP today - last done 2021.    - Basic metabolic panel  (Ca, Cl, CO2, Creat, Gluc, K, Na, BUN)  - losartan (COZAAR) 100 MG tablet  Dispense: 90 tablet; Refill: 1  - CT Coronary Calcium Scan    LULÚ (obstructive sleep apnea)  Has CPAP at home - but does not use.    Family history of breast cancer in mother       Family history of cardiovascular disease  Discussed risk reduction and screening options.  Recommend CT coronary calcium scan to determine risk and need for additional testing.    - CT Coronary Calcium Scan    Hyperlipidemia with target LDL less than 160   ASCVD score 5%.    - Lipid panel reflex to direct LDL Fasting    Special screening for malignant neoplasms, colon     - Colonoscopy Screening  Referral        Work on weight loss  Regular exercise  See Patient Instructions    Rachel Collins [facility]    Travon hickey is a 58 year old, presenting for the following health issues:  Hypertension, Health Maintenance (Due for Pappe/pe, will schedule, due for Mammogram, will be due for Colonoscopy June this year. ), and Imm/Inj (Covid, rsv, flu )        1/9/2024    10:52 AM   Additional Questions   Roomed by Christopher COLORADO CMA   Accompanied by self       History of Present Illness       Hypertension: She presents for follow up of hypertension.  She does check blood pressure  regularly outside of the clinic. Outside blood pressures have been over 140/90. She does not follow a low salt diet.     She eats 2-3 servings of fruits and vegetables daily.She consumes 0 sweetened beverage(s) daily.She exercises with enough effort to increase her heart rate 30 to 60 minutes per day.  She exercises with  "enough effort to increase her heart rate 3 or less days per week.   She is taking medications regularly.     Ran out of medications a few days ago.  Monitors blood pressure at home and averaging 140-150's/80's    Family history of dad with heart disease (was a smoker), no other significant family history other than mother with breast cancer at age 47 - diagnosed at 36.  She does yearly mammograms - has not has genetic testing.     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    The 10-year ASCVD risk score (Iron SCOTT, et al., 2019) is: 5.1%    Values used to calculate the score:      Age: 58 years      Sex: Female      Is Non- : No      Diabetic: No      Tobacco smoker: No      Systolic Blood Pressure: 150 mmHg      Is BP treated: Yes      HDL Cholesterol: 62 mg/dL      Total Cholesterol: 176 mg/dL    Review of Systems   Constitutional, HEENT, cardiovascular, pulmonary, GI, , musculoskeletal, neuro, skin, endocrine and psych systems are negative, except as otherwise noted.      Objective    BP (!) 150/102 (BP Location: Left arm, Patient Position: Sitting, Cuff Size: Adult Regular)   Pulse 80   Temp 97.4  F (36.3  C) (Tympanic)   Resp 12   Ht 1.638 m (5' 4.5\")   SpO2 96%   BMI 26.09 kg/m    Body mass index is 26.09 kg/m .  Physical Exam   GENERAL: healthy, alert and no distress  NECK: no adenopathy, no asymmetry, masses, or scars and thyroid normal to palpation  RESP: lungs clear to auscultation - no rales, rhonchi or wheezes  CV: regular rate and rhythm, normal S1 S2, no S3 or S4, no murmur, click or rub, no peripheral edema and peripheral pulses strong  ABDOMEN: soft, nontender, no hepatosplenomegaly, no masses and bowel sounds normal  MS: no gross musculoskeletal defects noted, no edema  PSYCH: mentation appears normal, affect normal/bright               "

## 2024-01-30 ENCOUNTER — TELEPHONE (OUTPATIENT)
Dept: FAMILY MEDICINE | Facility: CLINIC | Age: 59
End: 2024-01-30

## 2024-01-30 ENCOUNTER — APPOINTMENT (OUTPATIENT)
Dept: LAB | Facility: CLINIC | Age: 59
End: 2024-01-30
Payer: COMMERCIAL

## 2024-01-30 ENCOUNTER — ALLIED HEALTH/NURSE VISIT (OUTPATIENT)
Dept: FAMILY MEDICINE | Facility: CLINIC | Age: 59
End: 2024-01-30
Payer: COMMERCIAL

## 2024-01-30 VITALS — OXYGEN SATURATION: 96 % | DIASTOLIC BLOOD PRESSURE: 90 MMHG | HEART RATE: 71 BPM | SYSTOLIC BLOOD PRESSURE: 148 MMHG

## 2024-01-30 DIAGNOSIS — I10 HYPERTENSION GOAL BP (BLOOD PRESSURE) < 140/90: Primary | ICD-10-CM

## 2024-01-30 LAB
ANION GAP SERPL CALCULATED.3IONS-SCNC: 14 MMOL/L (ref 7–15)
BUN SERPL-MCNC: 13.8 MG/DL (ref 8–23)
CALCIUM SERPL-MCNC: 9.7 MG/DL (ref 8.8–10.2)
CHLORIDE SERPL-SCNC: 104 MMOL/L (ref 98–107)
CHOLEST SERPL-MCNC: 206 MG/DL
CREAT SERPL-MCNC: 0.63 MG/DL (ref 0.51–0.95)
DEPRECATED HCO3 PLAS-SCNC: 23 MMOL/L (ref 22–29)
EGFRCR SERPLBLD CKD-EPI 2021: >90 ML/MIN/1.73M2
FASTING STATUS PATIENT QL REPORTED: YES
GLUCOSE SERPL-MCNC: 102 MG/DL (ref 70–99)
HDLC SERPL-MCNC: 52 MG/DL
LDLC SERPL CALC-MCNC: 138 MG/DL
NONHDLC SERPL-MCNC: 154 MG/DL
POTASSIUM SERPL-SCNC: 4.2 MMOL/L (ref 3.4–5.3)
SODIUM SERPL-SCNC: 141 MMOL/L (ref 135–145)
TRIGL SERPL-MCNC: 80 MG/DL

## 2024-01-30 PROCEDURE — 99207 PR NO CHARGE NURSE ONLY: CPT

## 2024-01-30 PROCEDURE — 80061 LIPID PANEL: CPT | Performed by: NURSE PRACTITIONER

## 2024-01-30 PROCEDURE — 80048 BASIC METABOLIC PNL TOTAL CA: CPT | Performed by: NURSE PRACTITIONER

## 2024-01-30 PROCEDURE — 36415 COLL VENOUS BLD VENIPUNCTURE: CPT | Performed by: NURSE PRACTITIONER

## 2024-01-30 RX ORDER — AMLODIPINE BESYLATE 2.5 MG/1
2.5 TABLET ORAL DAILY
Qty: 90 TABLET | Refills: 0 | Status: SHIPPED | OUTPATIENT
Start: 2024-01-30 | End: 2024-03-12

## 2024-01-30 NOTE — PROGRESS NOTES
Britt German is a 58 year old year old patient who comes in today for a Blood Pressure check because of medication change. Increased losartan to 100mg    Vital Signs as repeated by /90 p 71 144/90 p 71    Patient is taking medication as prescribed    Patient is tolerating medications well.    Patient is monitoring Blood Pressure at home.  Average readings if yes are 150's systolic    Current complaints: headaches-doesn't normally get headaches but has noticed a few since the increase. Not severe.     Disposition:  patient to continue with the same medication    Labs done today      Joshua Owens RN

## 2024-01-30 NOTE — TELEPHONE ENCOUNTER
Blood pressure still not at goal which may be contributing to the headaches.  Continue monitoring with home machine and bring readings to next clinic visit.  Added Amlodipine small dose 2.5 mg to further control blood pressure   Continue the Losartan as prescribed.    Follow-up with me in clinic for medication recheck, blood pressure recheck and follow up in 4-6 weeks.    Rachel Collins, DNP

## 2024-02-02 NOTE — TELEPHONE ENCOUNTER
Called pt & read providers message. Pt verbalized understanding & has already picked up & started the Amlodipine. Pt will cont to check BP & log readings & bring to appt.   Follow up appt made with provider for 3/12/24.    Jacquelin Jones RN

## 2024-03-12 ENCOUNTER — OFFICE VISIT (OUTPATIENT)
Dept: FAMILY MEDICINE | Facility: CLINIC | Age: 59
End: 2024-03-12
Payer: COMMERCIAL

## 2024-03-12 ENCOUNTER — MYC MEDICAL ADVICE (OUTPATIENT)
Dept: FAMILY MEDICINE | Facility: CLINIC | Age: 59
End: 2024-03-12

## 2024-03-12 VITALS
DIASTOLIC BLOOD PRESSURE: 98 MMHG | HEART RATE: 70 BPM | TEMPERATURE: 97.1 F | OXYGEN SATURATION: 95 % | SYSTOLIC BLOOD PRESSURE: 178 MMHG | RESPIRATION RATE: 16 BRPM

## 2024-03-12 DIAGNOSIS — R53.83 OTHER FATIGUE: ICD-10-CM

## 2024-03-12 DIAGNOSIS — G47.33 OSA (OBSTRUCTIVE SLEEP APNEA): ICD-10-CM

## 2024-03-12 DIAGNOSIS — I10 HYPERTENSION GOAL BP (BLOOD PRESSURE) < 140/90: Primary | ICD-10-CM

## 2024-03-12 LAB
ERYTHROCYTE [DISTWIDTH] IN BLOOD BY AUTOMATED COUNT: 12.3 % (ref 10–15)
FERRITIN SERPL-MCNC: 190 NG/ML (ref 11–328)
HBA1C MFR BLD: 6.2 % (ref 0–5.6)
HCT VFR BLD AUTO: 37.7 % (ref 35–47)
HGB BLD-MCNC: 12.6 G/DL (ref 11.7–15.7)
INSULIN SERPL-ACNC: 24.2 UU/ML (ref 2.6–24.9)
MCH RBC QN AUTO: 29.7 PG (ref 26.5–33)
MCHC RBC AUTO-ENTMCNC: 33.4 G/DL (ref 31.5–36.5)
MCV RBC AUTO: 89 FL (ref 78–100)
PLATELET # BLD AUTO: 213 10E3/UL (ref 150–450)
RBC # BLD AUTO: 4.24 10E6/UL (ref 3.8–5.2)
TSH SERPL DL<=0.005 MIU/L-ACNC: 1.57 UIU/ML (ref 0.3–4.2)
WBC # BLD AUTO: 5.2 10E3/UL (ref 4–11)

## 2024-03-12 PROCEDURE — 36415 COLL VENOUS BLD VENIPUNCTURE: CPT | Performed by: NURSE PRACTITIONER

## 2024-03-12 PROCEDURE — 84443 ASSAY THYROID STIM HORMONE: CPT | Performed by: NURSE PRACTITIONER

## 2024-03-12 PROCEDURE — 83525 ASSAY OF INSULIN: CPT | Performed by: NURSE PRACTITIONER

## 2024-03-12 PROCEDURE — 99214 OFFICE O/P EST MOD 30 MIN: CPT | Performed by: NURSE PRACTITIONER

## 2024-03-12 PROCEDURE — 82728 ASSAY OF FERRITIN: CPT | Performed by: NURSE PRACTITIONER

## 2024-03-12 PROCEDURE — 85027 COMPLETE CBC AUTOMATED: CPT | Performed by: NURSE PRACTITIONER

## 2024-03-12 PROCEDURE — 83036 HEMOGLOBIN GLYCOSYLATED A1C: CPT | Performed by: NURSE PRACTITIONER

## 2024-03-12 RX ORDER — LOSARTAN POTASSIUM 100 MG/1
100 TABLET ORAL DAILY
Qty: 90 TABLET | Refills: 3 | Status: SHIPPED | OUTPATIENT
Start: 2024-03-12 | End: 2024-05-07

## 2024-03-12 RX ORDER — AMLODIPINE BESYLATE 2.5 MG/1
2.5 TABLET ORAL DAILY
Qty: 90 TABLET | Refills: 0 | Status: CANCELLED | OUTPATIENT
Start: 2024-03-12

## 2024-03-12 RX ORDER — AMLODIPINE BESYLATE 5 MG/1
5 TABLET ORAL DAILY
Qty: 90 TABLET | Refills: 1 | Status: SHIPPED | OUTPATIENT
Start: 2024-03-12 | End: 2024-05-07

## 2024-03-12 ASSESSMENT — PAIN SCALES - GENERAL: PAINLEVEL: NO PAIN (0)

## 2024-03-12 NOTE — PROGRESS NOTES
Assessment & Plan     Hypertension goal BP (blood pressure) < 140/90  Not at goal, further increased the Amlodipine  Discussed salt intake, dietary changes and exercise.  Follow-up in 3 weeks with RN to recheck.    - losartan (COZAAR) 100 MG tablet  Dispense: 90 tablet; Refill: 3  - amLODIPine (NORVASC) 5 MG tablet  Dispense: 90 tablet; Refill: 1    LULÚ (obstructive sleep apnea)     - Adult Sleep Eval & Management  Referral    Other fatigue  Additional labs ordered.    - TSH with free T4 reflex  - Hemoglobin A1c  - CBC with platelets  - Ferritin  - Insulin level           See Patient Instructions    Subjective   ruperto is a 58 year old, presenting for the following health issues:  Hypertension and Health Maintenance (Reminded due for pappe/pe - scheduled this for May )        3/12/2024     8:24 AM   Additional Questions   Roomed by Christopher COLORADO CMA   Accompanied by self     Via the Health Maintenance questionnaire, the patient has reported the following services have been completed -Mammogram, this information has been sent to the abstraction team.  History of Present Illness       Hypertension: She presents for follow up of hypertension.  She does check blood pressure  regularly outside of the clinic. Outside blood pressures have been over 140/90. She does not follow a low salt diet.     She eats 2-3 servings of fruits and vegetables daily.She consumes 0 sweetened beverage(s) daily.She exercises with enough effort to increase her heart rate 20 to 29 minutes per day.  She exercises with enough effort to increase her heart rate 4 days per week.   She is taking medications regularly.     Home readings 126-170/80-90's over the past several weeks.  Added Amlodipine 2.5 mg at last visit due to elevated blood pressure   Stays active at home    History of LULÚ - wearing CPAP.  Stopped alcohol  Dietary changes.    Review of Systems  Constitutional, neuro, ENT, endocrine, pulmonary, cardiac, gastrointestinal,  genitourinary, musculoskeletal, integument and psychiatric systems are negative, except as otherwise noted.  POS for persistent fatigue, reports sleeping better since using CPAP more regularly. Has not seen sleep medicine in a few years. Reports mask is not fitting properly.  Denies any chest pain, shortness of breath, vaginal bleeding or blood in stool.      Objective    BP (!) 178/98 (BP Location: Left arm, Patient Position: Sitting, Cuff Size: Adult Large)   Pulse 70   Temp 97.1  F (36.2  C) (Tympanic)   Resp 16   SpO2 95%   There is no height or weight on file to calculate BMI.  BP Readings from Last 6 Encounters:   03/12/24 (!) 178/98   01/30/24 (!) 148/90   01/09/24 (!) 150/102   11/06/23 (!) 166/88   03/16/22 (!) 145/88   05/03/21 126/82       Physical Exam   GENERAL: alert and no distress  NECK: no adenopathy, no asymmetry, masses, or scars  RESP: lungs clear to auscultation - no rales, rhonchi or wheezes  CV: regular rate and rhythm, normal S1 S2, no S3 or S4, no murmur, click or rub, no peripheral edema  ABDOMEN: soft, nontender, no hepatosplenomegaly, no masses and bowel sounds normal  MS: no gross musculoskeletal defects noted, no edema             Signed Electronically by: Rachel Collins, DNP

## 2024-03-12 NOTE — PATIENT INSTRUCTIONS
Increased Amlodipine to 5 mg   Continue Losartan 100 mg for blood pressure control    Schedule coronary calcium scan     Additional labs today.  Continue monitor blood pressure at home     Follow up with RN in 3 weeks after dosage adjustment.  Referral to Sleep Medicine - consult.    Rachel Collins, DNP

## 2024-03-13 NOTE — CONFIDENTIAL NOTE
Patient called back and needs assistance with scheduling this appointment, she is scheduled on 3-20-24 for a telephone visit with PCP (patient requests telephone visit) to discuss labs and treatment options.      EHSAN Carrasco

## 2024-03-18 ENCOUNTER — HOSPITAL ENCOUNTER (OUTPATIENT)
Dept: CT IMAGING | Facility: CLINIC | Age: 59
Discharge: HOME | End: 2024-03-18
Attending: NURSE PRACTITIONER | Admitting: NURSE PRACTITIONER
Payer: COMMERCIAL

## 2024-03-18 DIAGNOSIS — I10 UNCONTROLLED STAGE 2 HYPERTENSION: ICD-10-CM

## 2024-03-18 DIAGNOSIS — I10 HYPERTENSION GOAL BP (BLOOD PRESSURE) < 140/90: ICD-10-CM

## 2024-03-18 DIAGNOSIS — Z82.49 FAMILY HISTORY OF CARDIOVASCULAR DISEASE: ICD-10-CM

## 2024-03-18 PROCEDURE — 75571 CT HRT W/O DYE W/CA TEST: CPT

## 2024-03-18 PROCEDURE — 75571 CT HRT W/O DYE W/CA TEST: CPT | Mod: 26 | Performed by: INTERNAL MEDICINE

## 2024-03-19 ENCOUNTER — HOSPITAL ENCOUNTER (OUTPATIENT)
Dept: MAMMOGRAPHY | Facility: CLINIC | Age: 59
Discharge: HOME | End: 2024-03-19
Attending: NURSE PRACTITIONER | Admitting: NURSE PRACTITIONER
Payer: COMMERCIAL

## 2024-03-19 DIAGNOSIS — Z12.31 VISIT FOR SCREENING MAMMOGRAM: ICD-10-CM

## 2024-03-19 PROCEDURE — 77067 SCR MAMMO BI INCL CAD: CPT

## 2024-03-20 ENCOUNTER — VIRTUAL VISIT (OUTPATIENT)
Dept: FAMILY MEDICINE | Facility: CLINIC | Age: 59
End: 2024-03-20
Payer: COMMERCIAL

## 2024-03-20 DIAGNOSIS — E66.09 CLASS 1 OBESITY DUE TO EXCESS CALORIES WITH SERIOUS COMORBIDITY AND BODY MASS INDEX (BMI) OF 31.0 TO 31.9 IN ADULT: ICD-10-CM

## 2024-03-20 DIAGNOSIS — R73.03 PRE-DIABETES: ICD-10-CM

## 2024-03-20 DIAGNOSIS — E78.5 HYPERLIPIDEMIA WITH TARGET LDL LESS THAN 160: ICD-10-CM

## 2024-03-20 DIAGNOSIS — E66.811 CLASS 1 OBESITY DUE TO EXCESS CALORIES WITH SERIOUS COMORBIDITY AND BODY MASS INDEX (BMI) OF 31.0 TO 31.9 IN ADULT: ICD-10-CM

## 2024-03-20 DIAGNOSIS — I10 HYPERTENSION GOAL BP (BLOOD PRESSURE) < 140/90: Primary | ICD-10-CM

## 2024-03-20 PROCEDURE — 98967 PH1 ASSMT&MGMT NQHP 11-20: CPT | Mod: 93 | Performed by: NURSE PRACTITIONER

## 2024-03-20 NOTE — PROGRESS NOTES
ruperto is a 58 year old who is being evaluated via a billable telephone visit.    What phone number would you like to be contacted at? [phone]  How would you like to obtain your AVS? Jhonatanhartod  Originating Location (pt. Location): Home    Distant Location (provider location):  On-site    Assessment & Plan     Hypertension goal BP (blood pressure) < 140/90  Not controlled - will continue to monitor and if not at goal would increase Amlodipine to 10 mg    - Comprehensive metabolic panel (BMP + Alb, Alk Phos, ALT, AST, Total. Bili, TP)    Hyperlipidemia with target LDL less than 160  Recheck Lipids - lifestyle changes discussed and weight loss.    - Lipid panel reflex to direct LDL Fasting    Class 1 obesity due to excess calories with serious comorbidity and body mass index (BMI) of 31.0 to 31.9 in adult  Discussed that likely she may not qualify for this based on BMI.  Patient with resistant hypertension and newly diagnosed pre diabetes unable to lose weight by conventional means - lifestyle and daily exercise.  The risks, benefits and treatment options of prescribed medications or other treatments have been discussed with the patient. The patient verbalized their understanding and should call or follow up if no improvement or if they develop further problems.  Denies personal or FH of medullary thyroid cancer.  - Comprehensive metabolic panel (BMP + Alb, Alk Phos, ALT, AST, Total. Bili, TP)  - semaglutide (OZEMPIC) 2 MG/3ML pen  Dispense: 3 mL; Refill: 1    Pre-diabetes     - Hemoglobin A1c  - Comprehensive metabolic panel (BMP + Alb, Alk Phos, ALT, AST, Total. Bili, TP)             Work on weight loss  Regular exercise  See Patient Instructions    Subjective   ruperto is a 58 year old, presenting for the following health issues:  Diabetes and Hypertension        3/20/2024     8:31 AM   Additional Questions   Roomed by Rachel COLORADO CMA     History of Present Illness       Hypertension: She presents for follow up of  "hypertension.  She does check blood pressure  regularly outside of the clinic. Outside blood pressures have been over 140/90. She does not follow a low salt diet.     She eats 2-3 servings of fruits and vegetables daily.She consumes 0 sweetened beverage(s) daily.She exercises with enough effort to increase her heart rate 20 to 29 minutes per day.  She exercises with enough effort to increase her heart rate 4 days per week.   She is taking medications regularly.       Pre-Diabetes   How often are you checking your blood sugar? Not at all  What concerns do you have today about your diabetes? None   Do you have any of these symptoms? (Select all that apply)  No numbness or tingling in feet.  No redness, sores or blisters on feet.  No complaints of excessive thirst.  No reports of blurry vision.  No significant changes to weight.      BP Readings from Last 2 Encounters:   03/12/24 (!) 178/98   01/30/24 (!) 148/90     Hemoglobin A1C (%)   Date Value   03/12/2024 6.2 (H)     LDL Cholesterol Calculated (mg/dL)   Date Value   01/30/2024 138 (H)   05/03/2021 106 (H)   04/18/2019 103 (H)             Review of Systems  Constitutional, neuro, ENT, endocrine, pulmonary, cardiac, gastrointestinal, genitourinary, musculoskeletal, integument and psychiatric systems are negative, except as otherwise noted.      Objective    Vitals - Patient Reported  Systolic (Patient Reported): (!) 142  Diastolic (Patient Reported): 81  Weight (Patient Reported): 81.6 kg (180 lb)  Height (Patient Reported): 164.5 cm (5' 4.75\")  BMI (Based on Pt Reported Ht/Wt): 30.18  Temperature (Patient Reported): 97.1  F (36.2  C)  Pain Score: No Pain (0)   13      Physical Exam   General: Alert and no distress //Respiratory: No audible wheeze, cough, or shortness of breath // Psychiatric:  Appropriate affect, tone, and pace of words      Labs ordered for 6 month.      Phone call duration: 13 minutes  Signed Electronically by: Rachel Collins DNP    "

## 2024-03-20 NOTE — PATIENT INSTRUCTIONS
Recommend follow up on medication and weight loss in 2 months.    Labs - ordered - fasting in 6 months.    Prescription written for Ozempic at starting dose 0.25 mg.  Discussed side effects and proper use of GLP-1    Discussed lifestyle changes and daily.    Rachel Collins, DNP

## 2024-03-24 ENCOUNTER — TELEPHONE (OUTPATIENT)
Dept: FAMILY MEDICINE | Facility: CLINIC | Age: 59
End: 2024-03-24
Payer: COMMERCIAL

## 2024-03-24 NOTE — TELEPHONE ENCOUNTER
Prior Authorization required on Ozempic 0.25/0.5mg  Insurance Phone: fax [phone] phone [phone]  Patient ID: [id number]  Please contact the pharmacy with Prior Auth status (approved/denied).    Thanks,  Blayne Rich   [facility]  [phone]

## 2024-03-26 NOTE — TELEPHONE ENCOUNTER
PA Initiation    Medication: OZEMPIC (0.25 OR 0.5 MG/DOSE) 2 MG/3ML SC SOPN  Insurance Company: [employer] [place] - Phone [phone] Fax [phone]  Pharmacy Filling the Rx: [facility] - [place], [place] - [address]  Filling Pharmacy Phone: [phone]  Filling Pharmacy Fax: [phone]  Start Date: 3/26/2024       Thank you,     Jc Trevino [facility]  Pharmacy Clinic [facility]   [email or web address]   Phone: [phone]  Fax: [phone]

## 2024-03-26 NOTE — TELEPHONE ENCOUNTER
PRIOR AUTHORIZATION DENIED    Medication: OZEMPIC (0.25 OR 0.5 MG/DOSE) [facility]  Insurance Company: [employer] [place] - Phone [phone] Fax [phone]  Denial Date: 3/26/2024  Denial Reason(s): Must be being used to treat type 2 diabetes    Appeal Information: N/A - cannot appeal  Patient Notified: No    Thank you,     Jc Trevino [facility]  Pharmacy Clinic [facility]   [email or web address]   Phone: [phone]  Fax: [phone]

## 2024-03-27 ENCOUNTER — MYC MEDICAL ADVICE (OUTPATIENT)
Dept: FAMILY MEDICINE | Facility: CLINIC | Age: 59
End: 2024-03-27
Payer: COMMERCIAL

## 2024-03-27 NOTE — TELEPHONE ENCOUNTER
Patient notified with acknowledgement.   She states she is going to contact pharmacy to see what cost would be OOP.    Charisma Ritter RN  [facility]

## 2024-03-28 NOTE — TELEPHONE ENCOUNTER
Continue Reclast and follow-up with Endo as noted.   Notified patient of the reasoning for denial of Ozempic prescription.

## 2024-04-30 ENCOUNTER — OFFICE VISIT (OUTPATIENT)
Dept: SLEEP MEDICINE | Facility: CLINIC | Age: 59
End: 2024-04-30
Payer: COMMERCIAL

## 2024-04-30 VITALS
OXYGEN SATURATION: 99 % | DIASTOLIC BLOOD PRESSURE: 78 MMHG | WEIGHT: 172 LBS | BODY MASS INDEX: 29.07 KG/M2 | HEART RATE: 58 BPM | SYSTOLIC BLOOD PRESSURE: 122 MMHG

## 2024-04-30 DIAGNOSIS — G47.33 OSA (OBSTRUCTIVE SLEEP APNEA): Primary | ICD-10-CM

## 2024-04-30 PROCEDURE — 99203 OFFICE O/P NEW LOW 30 MIN: CPT | Performed by: INTERNAL MEDICINE

## 2024-04-30 ASSESSMENT — SLEEP AND FATIGUE QUESTIONNAIRES
HOW LIKELY ARE YOU TO NOD OFF OR FALL ASLEEP IN A CAR, WHILE STOPPED FOR A FEW MINUTES IN TRAFFIC: WOULD NEVER DOZE
HOW LIKELY ARE YOU TO NOD OFF OR FALL ASLEEP WHILE SITTING AND READING: SLIGHT CHANCE OF DOZING
HOW LIKELY ARE YOU TO NOD OFF OR FALL ASLEEP WHILE WATCHING TV: SLIGHT CHANCE OF DOZING
HOW LIKELY ARE YOU TO NOD OFF OR FALL ASLEEP WHILE LYING DOWN TO REST IN THE AFTERNOON WHEN CIRCUMSTANCES PERMIT: SLIGHT CHANCE OF DOZING
HOW LIKELY ARE YOU TO NOD OFF OR FALL ASLEEP WHILE SITTING INACTIVE IN A PUBLIC PLACE: WOULD NEVER DOZE
HOW LIKELY ARE YOU TO NOD OFF OR FALL ASLEEP WHEN YOU ARE A PASSENGER IN A CAR FOR AN HOUR WITHOUT A BREAK: SLIGHT CHANCE OF DOZING
HOW LIKELY ARE YOU TO NOD OFF OR FALL ASLEEP WHILE SITTING QUIETLY AFTER LUNCH WITHOUT ALCOHOL: WOULD NEVER DOZE
HOW LIKELY ARE YOU TO NOD OFF OR FALL ASLEEP WHILE SITTING AND TALKING TO SOMEONE: WOULD NEVER DOZE

## 2024-04-30 NOTE — PROGRESS NOTES
Additional 15 minutes on the date of service was spent performing the following:    -Preparing to see the patient  -Obtaining and/or reviewing separately obtained history   -Ordering medications, tests, or procedures   -Documenting clinical information in the electronic or other health record     Thank you for the opportunity to participate in the care of Britt German.     She is a 58 year old y/o female patient who comes to the sleep medicine clinic for follow up.  The patient was diagnosed with obstructive sleep apnea on 01/09/2018 (AHI = 46.7).  The patient would like to reestablish care so that she can see if she is a good candidate for inspire implant.  Otherwise she would like to apply to get a new CPAP machine from [employer].  She is getting benefit from CPAP therapy.     Assessment and Plan:  In summary Britt German is a 58 year old year old female who is here for follow up.    1. LULÚ (obstructive sleep apnea)  I educated the patient on the mechanisms and functions of inspire implant.  After this discussion she declined to proceed forward with inspire.  I congratulated the patient on her excellent CPAP usage.  I will apply for her to get a new CPAP machine but will change to pressure to 10-15 CWP.  Return to clinic annually.  - Adult Sleep Eval & Management  Referral  - COMPREHENSIVE DME    Compliance Download data for 30 days:  Compliance: 97%  Pressure setting: APAP 5-10 CWP  95% pressure: 9.9 CWP  Leak: Minimal  Residual AHI: 7.8 events per hour  Mask Tolerance: Good  Skin irritation: None  DME: [employer]    Lab reviewed: Discussed with patient.    Sleep-Wake Cycle:    TIME IN BED:    1) Work/School Days:    Do you work or go to school? Yes   What time do you usually get into bed? 10pm   About how long does it take you to fall asleep? 15minute   How often do you have trouble falling asleep? 0   How often do you wake up during the night?  @3times   Do you work days/evenings/nights/rotating shifts? Days   What wakes you up at night? Use the bathroom    Other   Please elaborate: mask   How often do you have trouble falling back to sleep?    About how long does it take to fall back to sleep?    What do you usually do if you have trouble getting back to sleep? nothing   What time do you usually get out of bed to start your day? 7   Do you use an alarm? No   2) Weekends/Non-work Days/All Other Days    What time do you usually get into bed? 11pm   About how long does it take you to fall asleep? 15minutes   What time do you usually get out of bed to start your day? 7am   Do you use an alarm? No   SLEEP NEED    On average, about how much sleep do you think you get? 7 hours   About how much sleep do you think you need? 7 hours   SLEEP POSITION    Which sleep positions do you prefer? Side   Do you do any of the following activities in bed? Read   How often do you take a nap on purpose? rarely   About how long are your naps? 20minutes   Do you feel better after naps? No   How often do you doze off unintentionally? 0   Have you ever had a driving accident or near-miss due to sleepiness/drowsiness? No       DEANDRE:  DEANDRE Total Score: 4  Total score - [place]: 4 (4/30/2024  3:18 PM)    Failed to redirect to the Timeline version of the [employer] SmartLink.   Patient Active Problem List   Diagnosis    Family history of breast cancer in mother    Tubal ligation status    Hyperlipidemia with target LDL less than 160    Recurrent UTI    Hypertension goal BP (blood pressure) < 140/90    Indeterminate pulmonary nodules    LULÚ (obstructive sleep apnea)       Past Medical History:   Diagnosis Date    Hypertension     Obstructive sleep apnea     Recurrent UTI        Past Surgical History:   Procedure Laterality Date    ABDOMINOPLASTY  2010    [facility]    ARTHROSCOPY ANKLE Left 09/01/2016    Procedure: ARTHROSCOPY ANKLE;  Surgeon: Zac Cuello DPM;  Location: [facility]     "BIOPSY BREAST      C/SECTION, LOW TRANSVERSE  ,     , Low Transverse    [facility] SURGICAL PATHOLOGY  ,     Breast reduction    D & C      DILATION AND CURETTAGE, HYSTEROSCOPY, ABLATE ENDOMETRIUM NOVASURE, COMBINED  2014    Procedure: COMBINED DILATION AND CURETTAGE, HYSTEROSCOPY, ABLATE ENDOMETRIUM NOVASURE;  Surgeon: Rachel Hernandez DO;  Location: [facility]    INCISION AND DRAINAGE LOWER EXTREMITY, COMBINED  2011    COMBINED INCISION AND DRAINAGE LOWER EXTREMITY performed by LEY, JEFFREY DUANE at [facility]    MAMMOPLASTY REDUCTION      OPEN REDUCTION INTERNAL FIXATION ANKLE Left 2015    Procedure: OPEN REDUCTION INTERNAL FIXATION ANKLE;  Surgeon: Zac Cuello DPM;  Location: [facility]    REMOVE HARDWARE ANKLE  2011    REMOVE HARDWARE ANKLE performed by LEY, JEFFREY DUANE at [facility]    REMOVE HARDWARE ANKLE Left 2016    Procedure: REMOVE HARDWARE ANKLE;  Surgeon: Zac Cuello DPM;  Location: [facility]    SURGICAL HISTORY OF -       Right ankle fracture, ORIF    TUBAL LIGATION         Current Outpatient Medications   Medication Sig Dispense Refill    amLODIPine (NORVASC) 5 MG tablet Take 1 tablet (5 mg) by mouth daily 90 tablet 1    losartan (COZAAR) 100 MG tablet Take 1 tablet (100 mg) by mouth daily 90 tablet 3    semaglutide (OZEMPIC) 2 MG/3ML pen Inject 0.25 mg Subcutaneous every 7 days for 30 days, THEN 0.5 mg every 7 days for 30 days. 3 mL 1       Allergies   Allergen Reactions    Hydrochlorothiazide Rash    Lisinopril      Hives       Physical Exam:  There were no vitals taken for this visit.  BMI:There is no height or weight on file to calculate BMI.   GEN: NAD,   Head: Normocephalic.  EYES: EOMI  Psych: normal mood, normal affect  The patient declined any other physical exams.    Labs/Studies:      No results found for: \"PH\", \"PHARTERIAL\", \"PO2\", \"SR9WEDDRRSA\", \"SAT\", \"PCO2\", \"HCO3\", \"BASEEXCESS\", \"JEROME\", \"BEB\"  Lab Results   Component " "Value Date    TSH 1.57 03/12/2024    TSH 0.89 06/26/2014     Lab Results   Component Value Date     (H) 01/30/2024    GLC 69 (L) 05/03/2021     Lab Results   Component Value Date    HGB 12.6 03/12/2024    HGB 12.2 06/26/2014     Lab Results   Component Value Date    BUN 13.8 01/30/2024    BUN 18 05/03/2021    CR 0.63 01/30/2024    CR 0.83 05/03/2021     Lab Results   Component Value Date    AST 19 03/16/2011    ALT 16 03/16/2011    ALKPHOS 66 03/16/2011    BILITOTAL 0.3 03/16/2011    BILICONJ 0.0 03/16/2011     No results found for: \"UAMP\", \"UBARB\", \"BENZODIAZEUR\", \"UCANN\", \"UCOC\", \"OPIT\", \"UPCP\"    Recent Labs   Lab Test 01/30/24  0858 05/03/21  1101    137   POTASSIUM 4.2 4.1   CHLORIDE 104 106   CO2 23 27   ANIONGAP 14 4   * 69*   BUN 13.8 18   CR 0.63 0.83   SHANAE 9.7 9.6       Ferritin   Date Value Ref Range Status   03/12/2024 190 11 - 328 ng/mL Final       I reviewed the efficacy and compliance report from her device. Data summarized on the HPI and the PAP compliance flow sheet.     Patient verbalized understanding of these issues, agrees with the plan and all questions were answered today. Patient was given an opportuntity to voice any other symptoms or concerns not listed above. Patient did not have any other symptoms or concerns.      Juan Antonio Good DO  Board Certified in Internal Medicine and Sleep Medicine    (Note created with Dragon voice recognition and unintended spelling errors and word substitutions may occur)     Audio and visual devices were used for this virtual clinic visit with permission from patient.   "

## 2024-05-07 ENCOUNTER — OFFICE VISIT (OUTPATIENT)
Dept: FAMILY MEDICINE | Facility: CLINIC | Age: 59
End: 2024-05-07
Payer: COMMERCIAL

## 2024-05-07 VITALS
TEMPERATURE: 97.6 F | BODY MASS INDEX: 29.71 KG/M2 | HEART RATE: 56 BPM | RESPIRATION RATE: 12 BRPM | SYSTOLIC BLOOD PRESSURE: 136 MMHG | OXYGEN SATURATION: 98 % | DIASTOLIC BLOOD PRESSURE: 84 MMHG | WEIGHT: 174 LBS | HEIGHT: 64 IN

## 2024-05-07 DIAGNOSIS — E78.5 HYPERLIPIDEMIA LDL GOAL <130: ICD-10-CM

## 2024-05-07 DIAGNOSIS — Z12.4 CERVICAL CANCER SCREENING: ICD-10-CM

## 2024-05-07 DIAGNOSIS — Z83.3 FAMILY HISTORY OF DIABETES MELLITUS: ICD-10-CM

## 2024-05-07 DIAGNOSIS — I10 HYPERTENSION GOAL BP (BLOOD PRESSURE) < 140/90: ICD-10-CM

## 2024-05-07 DIAGNOSIS — G47.33 OSA (OBSTRUCTIVE SLEEP APNEA): ICD-10-CM

## 2024-05-07 DIAGNOSIS — Z00.00 ROUTINE GENERAL MEDICAL EXAMINATION AT A HEALTH CARE FACILITY: Primary | ICD-10-CM

## 2024-05-07 DIAGNOSIS — Z12.11 SPECIAL SCREENING FOR MALIGNANT NEOPLASMS, COLON: ICD-10-CM

## 2024-05-07 PROCEDURE — G0145 SCR C/V CYTO,THINLAYER,RESCR: HCPCS | Performed by: NURSE PRACTITIONER

## 2024-05-07 PROCEDURE — 87624 HPV HI-RISK TYP POOLED RSLT: CPT | Performed by: NURSE PRACTITIONER

## 2024-05-07 PROCEDURE — 99396 PREV VISIT EST AGE 40-64: CPT | Performed by: NURSE PRACTITIONER

## 2024-05-07 PROCEDURE — 99213 OFFICE O/P EST LOW 20 MIN: CPT | Mod: 25 | Performed by: NURSE PRACTITIONER

## 2024-05-07 RX ORDER — AMLODIPINE BESYLATE 5 MG/1
5 TABLET ORAL DAILY
Qty: 90 TABLET | Refills: 3 | Status: SHIPPED | OUTPATIENT
Start: 2024-05-07

## 2024-05-07 RX ORDER — LOSARTAN POTASSIUM 100 MG/1
100 TABLET ORAL DAILY
Qty: 90 TABLET | Refills: 3 | Status: SHIPPED | OUTPATIENT
Start: 2024-05-07

## 2024-05-07 SDOH — HEALTH STABILITY: PHYSICAL HEALTH: ON AVERAGE, HOW MANY DAYS PER WEEK DO YOU ENGAGE IN MODERATE TO STRENUOUS EXERCISE (LIKE A BRISK WALK)?: 6 DAYS

## 2024-05-07 ASSESSMENT — SOCIAL DETERMINANTS OF HEALTH (SDOH): HOW OFTEN DO YOU GET TOGETHER WITH FRIENDS OR RELATIVES?: TWICE A WEEK

## 2024-05-07 ASSESSMENT — PAIN SCALES - GENERAL: PAINLEVEL: NO PAIN (0)

## 2024-05-07 NOTE — COMMUNITY RESOURCES LIST (ENGLISH)
May 7, 2024           YOUR PERSONALIZED LIST OF SERVICES & PROGRAMS           & SHELTER    Housing      and [facility] - Emergency Housing Assistance  [address] [place] [address] (Distance: 15.7 miles)  Website: https://www.[facility].org/emergency-housing-  Language: English  Fee: Free      [facility] - Domestic violence shelter  [address] [place] [address] (Distance: 5.0 miles)  Phone: (139) [phone]  Website: https://www.[employer].org/our-work/safety/  Language: English  Fee: Free  Accessibility: Translation services, Blind accommodation, Deaf or hard of hearing      [facility] - [employer] [facility] [employer]  Phone: [phone]  Website: https://www.Motribe/  Language: English, Hmong, Oromo, Salvadorean, Wallisian  Hours: Mon 9:00 AM - 5:00 PM Tue 9:00 AM - 5:00 PM Wed 9:00 AM - 5:00 PM Thu 9:00 AM - 5:00 PM Fri 9:00 AM - 5:00 PM  Fee: Insurance  Accessibility: Blind accommodation, Deaf or hard of hearing, Translation services  Transportation Options: Free transportation    Case Management      [employer] - Housing Stabilization Services  Phone: [phone]  Website: https://homebasemn.com/  Language: English  Hours: Mon 8:00 AM - 4:00 PM Tue 8:00 AM - 4:00 PM Wed 8:00 AM - 4:00 PM Thu 8:00 AM - 4:00 PM Fri 8:00 AM - 4:00 PM  Fee: Free  Accessibility: Blind accommodation, Deaf or hard of hearing  Transportation Options: Free transportation    Drop-In Services      [facility] or [facility] [place]  [address] [place] [address] (Distance: 23.9 miles)  Language: Faroese, English, Ruthy, Hmong, Salvadorean, Wallisian, Tamil  Fee: Free      [facility] - Warming or cooling [place]  [address] [place] [address] (Distance: 4.8 miles)  Website: https://www.osceRushpubliclibrary.org/  Language: English  Fee: Free      [employer]  Website:  http://www.laundrylove.org               IMPORTANT NUMBERS & WEBSITES        Emergency Services  911  .   United Way  211 http://211unitedway.org  .   Poison Control  (712) 370-1360 http://[place]poison.org http://wis[place]poison.org  .     Suicide and Crisis Lifeline  988 http://984SOMA Barcelonaline.org  .   Childhelp [place] Child Abuse Hotline  845.599.7856 http://Childhelphotline.org   .   National Sexual Assault Hotline  (527) 750-2750 (HOPE) http://SportsBlogsn.org   .     [place] Runaway Safeline  (197) 447-7557 (RUNAWAY) http://STP GroupruOleOle.[facility]  .   Pregnancy & Postpartum Support  Call/text 215-248-2299  [place]: http://ppsupportmn.org  [place]: http://psichapters.com/wi  .   Substance Abuse National Helpline ([facility])  626-916-HELP (1213) http://Findtreatment.gov   .                DISCLAIMER: These resources have been generated via the [employer] Platform. [employer] does not endorse any service providers mentioned in this resource list. [employer] does not guarantee that the services mentioned in this resource list will be available to you or will improve your health or wellness.    [facility]

## 2024-05-07 NOTE — PATIENT INSTRUCTIONS
"Preventive Care Advice   This is general advice we often give to help people stay healthy. Your care team may have specific advice just for you. Please talk to your care team about your own preventive care needs.  Lifestyle  Exercise at least 150 minutes each week (30 minutes a day, 5 days a week).  Do muscle strengthening activities 2 days a week. These help control your weight and prevent disease.  No smoking.  Wear sunscreen to prevent skin cancer.  Have your home tested for radon every 2 to 5 years. Radon is a colorless, odorless gas that can harm your lungs. To learn more, go to www.health.[facility].[place]. and search for \"Radon in Homes.\"  Keep guns unloaded and locked up in a safe place like a safe or gun vault, or, use a gun lock and hide the keys. Always lock away bullets separately. To learn more, visit Bioclones.[place].gov and search for \"safe gun storage.\"  Nutrition  Eat 5 or more servings of fruits and vegetables each day.  Try wheat bread, brown rice and whole grain pasta (instead of white bread, rice, and pasta).  Get enough calcium and vitamin D. Check the label on foods and aim for 100% of the RDA (recommended daily allowance).  Regular exams  Have a dental exam and cleaning every 6 months.  See your health care team every year to talk about:  Any changes in your health.  Any medicines your care team has prescribed.  Preventive care, family planning, and ways to prevent chronic diseases.  Shots (vaccines)   HPV shots (up to age 26), if you've never had them before.  Hepatitis B shots (up to age 59), if you've never had them before.  COVID-19 shot: Get this shot when it's due.  Flu shot: Get a flu shot every year.  Tetanus shot: Get a tetanus shot every 10 years.  Pneumococcal, hepatitis A, and RSV shots: Ask your care team if you need these based on your risk.  Shingles shot (for age 50 and up).  General health tests  Diabetes screening:  Starting at age 35, Get screened for diabetes at least every 3 years.  If " you are younger than age 35, ask your care team if you should be screened for diabetes.  Cholesterol test: At age 39, start having a cholesterol test every 5 years, or more often if advised.  Bone density scan (DEXA): At age 50, ask your care team if you should have this scan for osteoporosis (brittle bones).  Hepatitis C: Get tested at least once in your life.  Abdominal aortic aneurysm screening: Talk to your doctor about having this screening if you:  Have ever smoked; and  Are biologically male; and  Are between the ages of 65 and 75.  STIs (sexually transmitted infections)  Before age 24: Ask your care team if you should be screened for STIs.  After age 24: Get screened for STIs if you're at risk. You are at risk for STIs (including HIV) if:  You are sexually active with more than one person.  You don't use condoms every time.  You or a partner was diagnosed with a sexually transmitted infection.  If you are at risk for HIV, ask about PrEP medicine to prevent HIV.  Get tested for HIV at least once in your life, whether you are at risk for HIV or not.  Cancer screening tests  Cervical cancer screening: If you have a cervix, begin getting regular cervical cancer screening tests at age 21. Most people who have regular screenings with normal results can stop after age 65. Talk about this with your provider.  Breast cancer scan (mammogram): If you've ever had breasts, begin having regular mammograms starting at age 40. This is a scan to check for breast cancer.  Colon cancer screening: It is important to start screening for colon cancer at age 45.  Have a colonoscopy test every 10 years (or more often if you're at risk) Or, ask your provider about stool tests like a FIT test every year or Cologuard test every 3 years.  To learn more about your testing options, visit: www.[employer] - Amniofilm/431957.pdf.  For help making a decision, visit: lashon/jf11626.  Prostate cancer screening test: If you have a prostate and are age 55  to 69, ask your provider if you would benefit from a yearly prostate cancer screening test.  Lung cancer screening: If you are a current or former smoker age 50 to 80, ask your care team if ongoing lung cancer screenings are right for you.  For informational purposes only. Not to replace the advice of your health care provider. Copyright   2023 [employer]. All rights reserved. Clinically reviewed by the [facility] Transitions Program. WhoSay 223846 - REV 04/24.

## 2024-05-07 NOTE — PROGRESS NOTES
"Preventive Care Visit  [facility]  Rachel Collins DNP, Family Medicine  May 7, 2024      Assessment & Plan     Routine general medical examination at a health care facility       Hypertension goal BP (blood pressure) < 140/90  Improved. Continue blood pressure medications.    - losartan (COZAAR) 100 MG tablet  Dispense: 90 tablet; Refill: 3  - amLODIPine (NORVASC) 5 MG tablet  Dispense: 90 tablet; Refill: 3    LULÚ (obstructive sleep apnea)  CPAP     Cervical cancer screening     - Pap Screen with HPV - recommended age 30 - 65 years    Hyperlipidemia LDL goal <130       Family history of diabetes mellitus       Special screening for malignant neoplasms, colon  Due for colonoscopy - reviewed previous colonoscopy     - Colonoscopy Screening  Referral      Patient has been advised of split billing requirements and indicates understanding: Yes         BMI  Estimated body mass index is 29.64 kg/m  as calculated from the following:    Height as of this encounter: 1.632 m (5' 4.25\").    Weight as of this encounter: 78.9 kg (174 lb).       Counseling  Appropriate preventive services were discussed with this patient, including applicable screening as appropriate for fall prevention, nutrition, physical activity, Tobacco-use cessation, weight loss and cognition.  Checklist reviewing preventive services available has been given to the patient.  Reviewed patient's diet, addressing concerns and/or questions.       See Patient Instructions    Travon hickey is a 58 year old, presenting for the following:  Physical        5/7/2024     7:52 AM   Additional Questions   Roomed by Christopher COLORADO CMA   Accompanied by self        Health Care Directive  Patient does not have a Health Care Directive or Living Will: Discussed advance care planning with patient; however, patient declined at this time.    HPI     Hypertension Follow-up    Do you check your blood pressure regularly outside of the " clinic? Yes   Are you following a low salt diet? Yes  Are your blood pressures ever more than 140 on the top number (systolic) OR more   than 90 on the bottom number (diastolic), for example 140/90? No      5/7/2024   General Health   How would you rate your overall physical health? Good   Feel stress (tense, anxious, or unable to sleep) Not at all         5/7/2024   Nutrition   Three or more servings of calcium each day? Yes   Diet: Carbohydrate counting   How many servings of fruit and vegetables per day? (!) 2-3   How many sweetened beverages each day? 0-1         5/7/2024   Exercise   Days per week of moderate/strenous exercise 6 days         5/7/2024   Social Factors   Frequency of gathering with friends or relatives Twice a week   Worry food won't last until get money to buy more No   Food not last or not have enough money for food? No   Do you have housing?  No   Are you worried about losing your housing? No   Lack of transportation? No   Unable to get utilities (heat,electricity)? No   Want help with housing or utility concern? No   (!) HOUSING CONCERN PRESENT      5/7/2024   Fall Risk   Fallen 2 or more times in the past year? No   Trouble with walking or balance? No          5/7/2024   Dental   Dentist two times every year? Yes         5/7/2024   TB Screening   Were you born outside of the [place]? No          Today's PHQ-2 Score:       1/9/2024    10:44 AM   PHQ-2 ( 1999 Pf[employer])   Q1: Little interest or pleasure in doing things 0   Q2: Feeling down, depressed or hopeless 0   PHQ-2 Score 0   Q1: Little interest or pleasure in doing things Not at all   Q2: Feeling down, depressed or hopeless Not at all   PHQ-2 Score 0         5/7/2024   Substance Use   Alcohol more than 3/day or more than 7/wk No   Do you use any other substances recreationally? No     Social History     Tobacco Use    Smoking status: Never     Passive exposure: Never    Smokeless tobacco: Never   Vaping Use    Vaping status: Never Used    Substance Use Topics    Alcohol use: Yes     Comment: 2-3 times weekly    Drug use: No           2024   Breast Cancer Screening   Family history of breast, colon, or ovarian cancer? Yes         2024   LAST FHS-7 RESULTS   1st degree relative breast or ovarian cancer Yes   Any relative bilateral breast cancer Yes   Any male have breast cancer No   Any ONE woman have BOTH breast AND ovarian cancer No   Any woman with breast cancer before 50yrs Yes   2 or more relatives with breast AND/OR ovarian cancer No   2 or more relatives with breast AND/OR bowel cancer No      Mammogram Screening - Mammogram every 1-2 years updated in Health Maintenance based on mutual decision making          2024   One time HIV Screening   Previous HIV test? No         2024   STI Screening   New sexual partner(s) since last STI/HIV test? No     History of abnormal Pap smear: NO - age 30-65 PAP every 5 years with negative HPV co-testing recommended  Last 3 Pap Results:   PAP (no units)   Date Value   2017 NIL   2014 NIL   2011 NIL           Latest Ref Rng & Units 2017     9:37 AM 2017     9:30 AM 2014    12:00 AM   PAP / HPV   PAP (Historical)  NIL   NIL    HPV 16 DNA NEG^Negative  Negative     HPV 18 DNA NEG^Negative  Negative     Other HR HPV NEG^Negative  Negative       ASCVD Risk   The 10-year ASCVD risk score (Iron SCOTT, et al., 2019) is: 5.3%    Values used to calculate the score:      Age: 58 years      Sex: Female      Is Non- : No      Diabetic: No      Tobacco smoker: No      Systolic Blood Pressure: 136 mmHg      Is BP treated: Yes      HDL Cholesterol: 52 mg/dL      Total Cholesterol: 206 mg/dL    Fracture Risk Assessment Tool  Link to Frax Calculator  Use the information below to complete the Frax calculator  : 1965  Sex: female  Weight (kg): 78.9 kg (actual weight)  Height (cm): 163.2 cm  Previous Fragility Fracture:  No  History of parent  with fractured hip:  No  Current Smoking:  No  Patient has been on glucocorticoids for more than 3 months (5mg/day or more): No  Rheumatoid Arthritis on Problem List:  No  Secondary Osteoporosis on Problem List:  No  Consumes 3 or more units of alcohol per day: No  Femoral Neck BMD (g/cm2)     Reviewed and updated as needed this visit by Provider                    Past Medical History:   Diagnosis Date    Hypertension     Obstructive sleep apnea     Recurrent UTI      Past Surgical History:   Procedure Laterality Date    ABDOMINOPLASTY      [facility]    ARTHROSCOPY ANKLE Left 2016    Procedure: ARTHROSCOPY ANKLE;  Surgeon: Zac Cuello DPM;  Location: [facility]    BIOPSY BREAST      C/SECTION, LOW TRANSVERSE  ,     , Low Transverse    CL AFF SURGICAL PATHOLOGY  ,     Breast reduction    D & C      DILATION AND CURETTAGE, HYSTEROSCOPY, ABLATE ENDOMETRIUM NOVASURE, COMBINED  2014    Procedure: COMBINED DILATION AND CURETTAGE, HYSTEROSCOPY, ABLATE ENDOMETRIUM NOVASURE;  Surgeon: Rachel Hernandez DO;  Location: [facility]    INCISION AND DRAINAGE LOWER EXTREMITY, COMBINED  2011    COMBINED INCISION AND DRAINAGE LOWER EXTREMITY performed by LEY, JEFFREY DUANE at [facility] OR    MAMMOPLASTY REDUCTION      OPEN REDUCTION INTERNAL FIXATION ANKLE Left 2015    Procedure: OPEN REDUCTION INTERNAL FIXATION ANKLE;  Surgeon: Zac Cuello DPM;  Location: [facility]    REMOVE HARDWARE ANKLE  2011    REMOVE HARDWARE ANKLE performed by LEY, JEFFREY DUANE at [facility]    REMOVE HARDWARE ANKLE Left 2016    Procedure: REMOVE HARDWARE ANKLE;  Surgeon: Zac Cuello DPM;  Location: [facility]    SURGICAL HISTORY OF -       Right ankle fracture, ORIF    TUBAL LIGATION       OB History    Para Term  AB Living   4 2 2 0 2 2   SAB IAB Ectopic Multiple Live Births   2 0 0 0 2      # Outcome Date GA Lbr Vicente/2nd Weight Sex Type Anes PTL Lv   4 SAB             3 SAB            2 Term      CS-Unspec   MITCHELL   1 Term      CS-Unspec   MITCHELL      Obstetric Comments    x 2     Lab work is in process  Labs reviewed in EPIC  BP Readings from Last 3 Encounters:   24 136/84   24 122/78   24 (!) 178/98    Wt Readings from Last 3 Encounters:   24 78.9 kg (174 lb)   24 78 kg (172 lb)   21 70 kg (154 lb 6.4 oz)            BP Readings from Last 6 Encounters:   24 136/84   24 122/78   24 (!) 178/98   24 (!) 148/90   24 (!) 150/102   23 (!) 166/88     Wt Readings from Last 4 Encounters:   24 78.9 kg (174 lb)   24 78 kg (172 lb)   21 70 kg (154 lb 6.4 oz)   20 77.6 kg (171 lb)               Patient Active Problem List   Diagnosis    Family history of breast cancer in mother    Tubal ligation status    Hyperlipidemia with target LDL less than 160    Recurrent UTI    Hypertension goal BP (blood pressure) < 140/90    Indeterminate pulmonary nodules    LULÚ (obstructive sleep apnea)     Past Surgical History:   Procedure Laterality Date    ABDOMINOPLASTY      [facility]    ARTHROSCOPY ANKLE Left 2016    Procedure: ARTHROSCOPY ANKLE;  Surgeon: Zac Cuello DPM;  Location: [facility]    BIOPSY BREAST      C/SECTION, LOW TRANSVERSE  ,     , Low Transverse    CL AFF SURGICAL PATHOLOGY  ,     Breast reduction    D & C      DILATION AND CURETTAGE, HYSTEROSCOPY, ABLATE ENDOMETRIUM NOVASURE, COMBINED  2014    Procedure: COMBINED DILATION AND CURETTAGE, HYSTEROSCOPY, ABLATE ENDOMETRIUM NOVASURE;  Surgeon: Rachel Hernandez DO;  Location: [facility]    INCISION AND DRAINAGE LOWER EXTREMITY, COMBINED  2011    COMBINED INCISION AND DRAINAGE LOWER EXTREMITY performed by LEY, JEFFREY DUANE at [facility]    MAMMOPLASTY REDUCTION      OPEN REDUCTION INTERNAL FIXATION ANKLE Left 2015    Procedure: OPEN REDUCTION INTERNAL FIXATION ANKLE;  Surgeon: Khushboo  "Zac Wen DPM;  Location: [facility]    REMOVE HARDWARE ANKLE  02/22/2011    REMOVE HARDWARE ANKLE performed by LEY, JEFFREY DUANE at [facility]    REMOVE HARDWARE ANKLE Left 09/01/2016    Procedure: REMOVE HARDWARE ANKLE;  Surgeon: Zac Cuello DPM;  Location: [facility]    SURGICAL HISTORY OF -   2006    Right ankle fracture, ORIF    TUBAL LIGATION  1994       Social History     Tobacco Use    Smoking status: Never     Passive exposure: Never    Smokeless tobacco: Never   Substance Use Topics    Alcohol use: Yes     Comment: 2-3 times weekly     Family History   Problem Relation Age of Onset    Breast Cancer Mother         dx age 38    Diabetes Sister     Cardiovascular Father         CHF    Respiratory Father         COPD    Cardiovascular Son         aortic stenosis    Diabetes Sister          Current Outpatient Medications   Medication Sig Dispense Refill    amLODIPine (NORVASC) 5 MG tablet Take 1 tablet (5 mg) by mouth daily 90 tablet 1    losartan (COZAAR) 100 MG tablet Take 1 tablet (100 mg) by mouth daily 90 tablet 3     Allergies   Allergen Reactions    Hydrochlorothiazide Rash    Lisinopril      Hives     Recent Labs   Lab Test 03/12/24  0909 01/30/24  0858 05/03/21  1101 04/18/19  0909   A1C 6.2*  --   --   --    LDL  --  138* 106* 103*   HDL  --  52 62 47*   TRIG  --  80 42 71   CR  --  0.63 0.83 0.69   GFRESTIMATED  --  >90 77 >90   GFRESTBLACK  --   --  90 >90   POTASSIUM  --  4.2 4.1 3.9   TSH 1.57  --   --   --           Review of Systems  Constitutional, HEENT, cardiovascular, pulmonary, GI, , musculoskeletal, neuro, skin, endocrine and psych systems are negative, except as otherwise noted.     Objective    Exam  /84 (BP Location: Right arm, Patient Position: Sitting, Cuff Size: Adult Large)   Pulse 56   Temp 97.6  F (36.4  C) (Tympanic)   Resp 12   Ht 1.632 m (5' 4.25\")   Wt 78.9 kg (174 lb)   SpO2 98%   BMI 29.64 kg/m     Estimated body mass index is 29.64 kg/m  as calculated from the " "following:    Height as of this encounter: 1.632 m (5' 4.25\").    Weight as of this encounter: 78.9 kg (174 lb).    Physical Exam  GENERAL: alert and no distress  EYES: Eyes grossly normal to inspection, PERRL and conjunctivae and sclerae normal  HENT: ear canals and TM's normal, nose and mouth without ulcers or lesions  NECK: no adenopathy, no asymmetry, masses, or scars  RESP: lungs clear to auscultation - no rales, rhonchi or wheezes  CV: regular rate and rhythm, normal S1 S2, no S3 or S4, no murmur, click or rub, no peripheral edema  ABDOMEN: soft, nontender, no hepatosplenomegaly, no masses and bowel sounds normal   (female): normal female external genitalia, normal urethral meatus , vaginal mucosal atrophy, and normal cervix, adnexae, and uterus without masses.  MS: no gross musculoskeletal defects noted, no edema  SKIN: no suspicious lesions or rashes  NEURO: Normal strength and tone, mentation intact and speech normal  PSYCH: mentation appears normal, affect normal/bright        Signed Electronically by: Rachel Collins DNP    "

## 2024-05-10 LAB
BKR LAB AP GYN ADEQUACY: NORMAL
BKR LAB AP GYN INTERPRETATION: NORMAL
BKR LAB AP HPV REFLEX: NORMAL
BKR LAB AP PREVIOUS ABNORMAL: NORMAL
PATH REPORT.COMMENTS IMP SPEC: NORMAL
PATH REPORT.COMMENTS IMP SPEC: NORMAL
PATH REPORT.RELEVANT HX SPEC: NORMAL

## 2024-05-14 LAB
HUMAN PAPILLOMA VIRUS 16 DNA: NEGATIVE
HUMAN PAPILLOMA VIRUS 18 DNA: NEGATIVE
HUMAN PAPILLOMA VIRUS FINAL DIAGNOSIS: NORMAL
HUMAN PAPILLOMA VIRUS OTHER HR: NEGATIVE

## 2024-08-09 ENCOUNTER — ANESTHESIA EVENT (OUTPATIENT)
Dept: GASTROENTEROLOGY | Facility: CLINIC | Age: 59
End: 2024-08-09
Payer: COMMERCIAL

## 2024-08-09 NOTE — ANESTHESIA PREPROCEDURE EVALUATION
Anesthesia Pre-Procedure Evaluation    Patient: Britt German   MRN: [id number] : 1965        Procedure : Procedure(s):  Colonoscopy          Past Medical History:   Diagnosis Date    Hyperlipidemia with target LDL less than 160 10/31/2010    Diagnosis updated by automated process. Provider to review and confirm.      Hypertension     Obstructive sleep apnea     Recurrent UTI       Past Surgical History:   Procedure Laterality Date    ABDOMINOPLASTY      [facility]    ARTHROSCOPY ANKLE Left 2016    Procedure: ARTHROSCOPY ANKLE;  Surgeon: Zac Cuello DPM;  Location: [facility]    BIOPSY BREAST      C/SECTION, LOW TRANSVERSE  ,     , Low Transverse    CL AFF SURGICAL PATHOLOGY  ,     Breast reduction    D & C      DILATION AND CURETTAGE, HYSTEROSCOPY, ABLATE ENDOMETRIUM NOVASURE, COMBINED  2014    Procedure: COMBINED DILATION AND CURETTAGE, HYSTEROSCOPY, ABLATE ENDOMETRIUM NOVASURE;  Surgeon: Rachel Hernandez DO;  Location: [facility]    INCISION AND DRAINAGE LOWER EXTREMITY, COMBINED  2011    COMBINED INCISION AND DRAINAGE LOWER EXTREMITY performed by LEY, JEFFREY DUANE at [facility]    MAMMOPLASTY REDUCTION      OPEN REDUCTION INTERNAL FIXATION ANKLE Left 2015    Procedure: OPEN REDUCTION INTERNAL FIXATION ANKLE;  Surgeon: Zac Cuello DPM;  Location: [facility]    REMOVE HARDWARE ANKLE  2011    REMOVE HARDWARE ANKLE performed by LEY, JEFFREY DUANE at [facility]    REMOVE HARDWARE ANKLE Left 2016    Procedure: REMOVE HARDWARE ANKLE;  Surgeon: Zac Cuello DPM;  Location: [facility]    SURGICAL HISTORY OF -       Right ankle fracture, ORIF    TUBAL LIGATION        Allergies   Allergen Reactions    Hydrochlorothiazide Rash    Lisinopril      Hives      Social History     Tobacco Use    Smoking status: Never     Passive exposure: Never    Smokeless tobacco: Never   Substance Use Topics    Alcohol use: Yes     Comment: 2-3 times  "weekly      Wt Readings from Last 1 Encounters:   05/07/24 78.9 kg (174 lb)        Anesthesia Evaluation   Pt has had prior anesthetic.     History of anesthetic complications       ROS/MED HX  ENT/Pulmonary:     (+) sleep apnea,    LULÚ risk factors,                                   Neurologic:       Cardiovascular:     (+) Dyslipidemia hypertension- -   -  - -                                      METS/Exercise Tolerance:     Hematologic:       Musculoskeletal:       GI/Hepatic:       Renal/Genitourinary:     (+) renal disease,             Endo:     (+)               Obesity,       Psychiatric/Substance Use:       Infectious Disease:       Malignancy:       Other:            Physical Exam    Airway  airway exam normal           Respiratory Devices and Support         Dental       (+) Minor Abnormalities - some fillings, tiny chips      Cardiovascular   cardiovascular exam normal          Pulmonary   pulmonary exam normal                OUTSIDE LABS:  CBC:   Lab Results   Component Value Date    WBC 5.2 03/12/2024    WBC 7.1 11/14/2011    HGB 12.6 03/12/2024    HGB 12.2 06/26/2014    HCT 37.7 03/12/2024    HCT 38.9 11/14/2011     03/12/2024     11/14/2011     BMP:   Lab Results   Component Value Date     01/30/2024     05/03/2021    POTASSIUM 4.2 01/30/2024    POTASSIUM 4.1 05/03/2021    CHLORIDE 104 01/30/2024    CHLORIDE 106 05/03/2021    CO2 23 01/30/2024    CO2 27 05/03/2021    BUN 13.8 01/30/2024    BUN 18 05/03/2021    CR 0.63 01/30/2024    CR 0.83 05/03/2021     (H) 01/30/2024    GLC 69 (L) 05/03/2021     COAGS: No results found for: \"PTT\", \"INR\", \"FIBR\"  POC:   Lab Results   Component Value Date    HCG Negative 12/23/2015    HCGS Negative 07/23/2006     HEPATIC:   Lab Results   Component Value Date    ALBUMIN 4.4 03/16/2011    PROTTOTAL 7.5 03/16/2011    ALT 16 03/16/2011    AST 19 03/16/2011    ALKPHOS 66 03/16/2011    BILITOTAL 0.3 03/16/2011     OTHER:   Lab Results "   Component Value Date    A1C 6.2 (H) 03/12/2024    SHANAE 9.7 01/30/2024    TSH 1.57 03/12/2024    CRP 5.2 02/16/2011    SED 21 (H) 02/16/2011       Anesthesia Plan    ASA Status:  3       Anesthesia Type: General.              Consents    Anesthesia Plan(s) and associated risks, benefits, and realistic alternatives discussed. Questions answered and patient/representative(s) expressed understanding.     - Discussed: Risks, Benefits and Alternatives for BOTH SEDATION and the PROCEDURE were discussed     - Discussed with:  Patient            Postoperative Care       PONV prophylaxis: Background Propofol Infusion     Comments:               DONTE Mtz CRNA    I have reviewed the pertinent notes and labs in the chart from the past 30 days and (re)examined the patient.  Any updates or changes from those notes are reflected in this note.

## 2024-08-13 ENCOUNTER — ANESTHESIA (OUTPATIENT)
Dept: GASTROENTEROLOGY | Facility: CLINIC | Age: 59
End: 2024-08-13
Payer: COMMERCIAL

## 2024-08-13 ENCOUNTER — HOSPITAL ENCOUNTER (OUTPATIENT)
Facility: CLINIC | Age: 59
Discharge: HOME | End: 2024-08-13
Attending: STUDENT IN AN ORGANIZED HEALTH CARE EDUCATION/TRAINING PROGRAM | Admitting: STUDENT IN AN ORGANIZED HEALTH CARE EDUCATION/TRAINING PROGRAM
Payer: COMMERCIAL

## 2024-08-13 VITALS
BODY MASS INDEX: 29.71 KG/M2 | RESPIRATION RATE: 12 BRPM | WEIGHT: 174 LBS | OXYGEN SATURATION: 97 % | DIASTOLIC BLOOD PRESSURE: 80 MMHG | SYSTOLIC BLOOD PRESSURE: 115 MMHG | HEIGHT: 64 IN | HEART RATE: 60 BPM | TEMPERATURE: 98.2 F

## 2024-08-13 LAB — COLONOSCOPY: NORMAL

## 2024-08-13 PROCEDURE — 45385 COLONOSCOPY W/LESION REMOVAL: CPT | Mod: PT | Performed by: STUDENT IN AN ORGANIZED HEALTH CARE EDUCATION/TRAINING PROGRAM

## 2024-08-13 PROCEDURE — 88305 TISSUE EXAM BY PATHOLOGIST: CPT | Mod: 26 | Performed by: PATHOLOGY

## 2024-08-13 PROCEDURE — 250N000009 HC RX 250: Performed by: PHYSICIAN ASSISTANT

## 2024-08-13 PROCEDURE — 250N000011 HC RX IP 250 OP 636: Performed by: NURSE ANESTHETIST, CERTIFIED REGISTERED

## 2024-08-13 PROCEDURE — 88305 TISSUE EXAM BY PATHOLOGIST: CPT | Mod: TC | Performed by: STUDENT IN AN ORGANIZED HEALTH CARE EDUCATION/TRAINING PROGRAM

## 2024-08-13 PROCEDURE — 370N000017 HC ANESTHESIA TECHNICAL FEE, PER MIN: Performed by: STUDENT IN AN ORGANIZED HEALTH CARE EDUCATION/TRAINING PROGRAM

## 2024-08-13 PROCEDURE — 258N000003 HC RX IP 258 OP 636: Performed by: PHYSICIAN ASSISTANT

## 2024-08-13 RX ORDER — SODIUM CHLORIDE, SODIUM LACTATE, POTASSIUM CHLORIDE, CALCIUM CHLORIDE 600; 310; 30; 20 MG/100ML; MG/100ML; MG/100ML; MG/100ML
INJECTION, SOLUTION INTRAVENOUS CONTINUOUS
Status: DISCONTINUED | OUTPATIENT
Start: 2024-08-13 | End: 2024-08-13 | Stop reason: HOSPADM

## 2024-08-13 RX ORDER — PROPOFOL 10 MG/ML
INJECTION, EMULSION INTRAVENOUS PRN
Status: DISCONTINUED | OUTPATIENT
Start: 2024-08-13 | End: 2024-08-13

## 2024-08-13 RX ORDER — NALOXONE HYDROCHLORIDE 0.4 MG/ML
0.4 INJECTION, SOLUTION INTRAMUSCULAR; INTRAVENOUS; SUBCUTANEOUS
Status: CANCELLED | OUTPATIENT
Start: 2024-08-13

## 2024-08-13 RX ORDER — NALOXONE HYDROCHLORIDE 0.4 MG/ML
0.2 INJECTION, SOLUTION INTRAMUSCULAR; INTRAVENOUS; SUBCUTANEOUS
Status: CANCELLED | OUTPATIENT
Start: 2024-08-13

## 2024-08-13 RX ORDER — FLUMAZENIL 0.1 MG/ML
0.2 INJECTION, SOLUTION INTRAVENOUS
Status: CANCELLED | OUTPATIENT
Start: 2024-08-13 | End: 2024-08-13

## 2024-08-13 RX ORDER — LIDOCAINE 40 MG/G
CREAM TOPICAL
Status: DISCONTINUED | OUTPATIENT
Start: 2024-08-13 | End: 2024-08-13 | Stop reason: HOSPADM

## 2024-08-13 RX ADMIN — SODIUM CHLORIDE, POTASSIUM CHLORIDE, SODIUM LACTATE AND CALCIUM CHLORIDE: 600; 310; 30; 20 INJECTION, SOLUTION INTRAVENOUS at 07:32

## 2024-08-13 RX ADMIN — PROPOFOL 150 MG: 10 INJECTION, EMULSION INTRAVENOUS at 08:03

## 2024-08-13 RX ADMIN — PROPOFOL 150 MG: 10 INJECTION, EMULSION INTRAVENOUS at 08:04

## 2024-08-13 RX ADMIN — LIDOCAINE HYDROCHLORIDE 0.2 ML: 10 INJECTION, SOLUTION EPIDURAL; INFILTRATION; INTRACAUDAL; PERINEURAL at 07:33

## 2024-08-13 RX ADMIN — PROPOFOL 50 MG: 10 INJECTION, EMULSION INTRAVENOUS at 08:17

## 2024-08-13 RX ADMIN — PROPOFOL 50 MG: 10 INJECTION, EMULSION INTRAVENOUS at 08:10

## 2024-08-13 ASSESSMENT — ACTIVITIES OF DAILY LIVING (ADL): ADLS_ACUITY_SCORE: 35

## 2024-08-13 NOTE — ANESTHESIA CARE TRANSFER NOTE
Patient: Britt German    Procedure: Procedure(s):  COLONOSCOPY, FLEXIBLE, WITH LESION REMOVAL USING SNARE       Diagnosis: Special screening for malignant neoplasm of colon [Z12.11]  Diagnosis Additional Information: No value filed.    Anesthesia Type:   General     Note:    Oropharynx: oropharynx clear of all foreign objects and spontaneously breathing  Level of Consciousness: awake  Oxygen Supplementation: room air    Independent Airway: airway patency satisfactory and stable    Vital Signs Stable: post-procedure vital signs reviewed and stable  Report to RN Given: handoff report given  Patient transferred to: Phase II    Handoff Report: Identifed the Patient, Identified the Reponsible Provider, Reviewed the pertinent medical history, Discussed the surgical course, Reviewed Intra-OP anesthesia mangement and issues during anesthesia, Set expectations for post-procedure period and Allowed opportunity for questions and acknowledgement of understanding      Vitals:  Vitals Value Taken Time   BP     Temp     Pulse     Resp     SpO2         Electronically Signed By: DONTE Webb CRNA  August 13, 2024  8:20 AM

## 2024-08-13 NOTE — H&P
[facility]    Pre-Endoscopy History and Physical     Britt German MRN# [id number]   YOB: 1965 Age: 59 year old     Date of Procedure: 2024  Primary care provider: Rachel Collins  Type of Endoscopy: Colonoscopy with possible biopsy, possible polypectomy  Reason for Procedure: surveillance  Type of Anesthesia Anticipated: Conscious Sedation    HPI:    Britt is a 59 year old female who will be undergoing the above procedure.      A history and physical has been performed. The patient's medications and allergies have been reviewed. The risks and benefits of the procedure and the sedation options and risks were discussed with the patient.  All questions were answered and informed consent was obtained.      She denies a personal or family history of anesthesia complications or bleeding disorders.     Colonoscopy, last one  with one polyp in rectum, surveillance. No AC. ASA II. No significant surgical hx. No family hx of colon cancer.     Patient Active Problem List   Diagnosis    Family history of breast cancer in mother    Tubal ligation status    Hyperlipidemia LDL goal <130    Recurrent UTI    Hypertension goal BP (blood pressure) < 140/90    Indeterminate pulmonary nodules    LULÚ (obstructive sleep apnea)    Family history of diabetes mellitus        Past Medical History:   Diagnosis Date    Hyperlipidemia with target LDL less than 160 10/31/2010    Diagnosis updated by automated process. Provider to review and confirm.      Hypertension     Obstructive sleep apnea     Recurrent UTI         Past Surgical History:   Procedure Laterality Date    ABDOMINOPLASTY      [facility]    ARTHROSCOPY ANKLE Left 2016    Procedure: ARTHROSCOPY ANKLE;  Surgeon: Zac Cuello DPM;  Location: [facility]    BIOPSY BREAST      C/SECTION, LOW TRANSVERSE  ,     , Low Transverse    CL AFF SURGICAL PATHOLOGY  ,     Breast reduction    D & C       DILATION AND CURETTAGE, HYSTEROSCOPY, ABLATE ENDOMETRIUM NOVASURE, COMBINED  07/02/2014    Procedure: COMBINED DILATION AND CURETTAGE, HYSTEROSCOPY, ABLATE ENDOMETRIUM NOVASURE;  Surgeon: Rachel Hernandez DO;  Location: [facility]    INCISION AND DRAINAGE LOWER EXTREMITY, COMBINED  02/22/2011    COMBINED INCISION AND DRAINAGE LOWER EXTREMITY performed by LEY, JEFFREY DUANE at [facility]    MAMMOPLASTY REDUCTION      OPEN REDUCTION INTERNAL FIXATION ANKLE Left 12/23/2015    Procedure: OPEN REDUCTION INTERNAL FIXATION ANKLE;  Surgeon: Zac Cuello DPM;  Location: [facility]    REMOVE HARDWARE ANKLE  02/22/2011    REMOVE HARDWARE ANKLE performed by LEY, JEFFREY DUANE at [facility]    REMOVE HARDWARE ANKLE Left 09/01/2016    Procedure: REMOVE HARDWARE ANKLE;  Surgeon: Zac Cuello DPM;  Location: [facility]    SURGICAL HISTORY OF -   2006    Right ankle fracture, ORIF    TUBAL LIGATION  1994       Social History     Tobacco Use    Smoking status: Never     Passive exposure: Never    Smokeless tobacco: Never   Substance Use Topics    Alcohol use: Yes     Comment: 2-3 times weekly       Family History   Problem Relation Age of Onset    Breast Cancer Mother         dx age 38    Diabetes Sister     Cardiovascular Father         CHF    Respiratory Father         COPD    Cardiovascular Son         aortic stenosis    Diabetes Sister        Prior to Admission medications    Medication Sig Start Date End Date Taking? Authorizing Provider   amLODIPine (NORVASC) 5 MG tablet Take 1 tablet (5 mg) by mouth daily 5/7/24  Yes Rachel Collins DNP   losartan (COZAAR) 100 MG tablet Take 1 tablet (100 mg) by mouth daily 5/7/24  Yes Rachel Collins DNP       Allergies   Allergen Reactions    Hydrochlorothiazide Rash    Lisinopril      Hives        REVIEW OF SYSTEMS:   5 point ROS negative except as noted above in HPI, including Gen., Resp., CV, GI &  system review.    PHYSICAL EXAM:   /86   Temp 98.1  F (36.7  C)    "Resp 16   Ht 1.626 m (5' 4\")   Wt 78.9 kg (174 lb)   SpO2 96%   BMI 29.87 kg/m   Estimated body mass index is 29.87 kg/m  as calculated from the following:    Height as of this encounter: 1.626 m (5' 4\").    Weight as of this encounter: 78.9 kg (174 lb).   Constitutional: Awake, alert, no acute distress.  Eyes: No scleral icterus.  Conjunctiva are without injection.  ENMT: Mucous membranes moist, dentition and gums are intact.   Neck: Soft, supple, trachea midline.    Endocrine: n/a   Lymphatic: There is no cervical, submandibularadenopathy.  Respiratory: normal efforts on room air  Cardiovascular: extremities warm and well perfused  Abdomen: Non-distended, non-tender,  No masses,  Musculoskeletal: Full range of motion in the upper and lower extremities.    Skin: No skin rashes or lesions to inspection.  No petechia.    Neurologic: alerted and oriented 3x  Psychiatric: The patient's affect is not blunted and mood is appropriate.  DIAGNOSTICS:    Not indicated    IMPRESSION   ASA Class 2 - Mild systemic disease    PLAN:   Plan for Colonoscopy with possible biopsy, possible polypectomy. We discussed the risks, benefits and alternatives and the patient wished to proceed.  Patient is cleared for the above procedure.    The above has been forwarded to the consulting provider.    Prakash Álvarez MD on 8/13/2024 at 7:18 AM  [facility]        "

## 2024-08-13 NOTE — ANESTHESIA POSTPROCEDURE EVALUATION
Patient: Britt German    Procedure: Procedure(s):  COLONOSCOPY, FLEXIBLE, WITH LESION REMOVAL USING SNARE       Anesthesia Type:  General    Note:  Disposition: Outpatient   Postop Pain Control: Uneventful            Sign Out: Well controlled pain   PONV: No   Neuro/Psych: Uneventful            Sign Out: Acceptable/Baseline neuro status   Airway/Respiratory: Uneventful            Sign Out: Acceptable/Baseline resp. status   CV/Hemodynamics: Uneventful            Sign Out: Acceptable CV status; No obvious hypovolemia; No obvious fluid overload   Other NRE: NONE   DID A NON-ROUTINE EVENT OCCUR? No           Last vitals:  Vitals Value Taken Time   /63 08/13/24 0821   Temp     Pulse 68 08/13/24 0821   Resp     SpO2 93 % 08/13/24 0828   Vitals shown include unfiled device data.    Electronically Signed By: DONTE Webb CRNA  August 13, 2024  8:30 AM

## 2024-08-13 NOTE — LETTER
Britt German  [address]  [place] [address]      August 15, 2024    Dear Britt,  This letter is written to inform you of the results of your recent colonoscopy.  Your examination showed polyp(s) in your descending colon. All polyps were removed in their entirety and sent for review by a pathologist. As you will see on the pathology report below, the tissue(s) were normal polyps. Your examination was otherwise without abnormality.    Normal polyps are polyps that test negative for any pathologic (abnormal) changes.    Given these findings, I recommend that you undergo a repeat colonoscopy in ten years for screening. We will enter you into a recall system so you receive a reminder closer to the time that you are due for repeat examination. Your physician also recommends that you adhere to a high fiber diet indefinitely to promote colon health.     Please remember that this recommendation is made with the understanding that you are not experiencing persistent changes in bowel function, bleeding per rectum, and/or significant abdominal pain. If you experience these symptoms, please contact your primary care provider for a further evaluation.     If you have any questions or concerns about the results of your colonoscopy or the appropriate follow-up, please contact my assistant at [phone]    Sincerely,      Prakash Álvarez MD   [facility]  ___                    Resulted Orders   Surgical Pathology Exam   Result Value Ref Range    Case Report       Surgical Pathology Report                         Case: [id number]                                  Authorizing Provider:  Prakash Álvarez MD       Collected:           08/13/2024 08:15 AM          Ordering Location:     [facility]   Received:            08/13/2024 08:39 AM                                 [place]                                                                      Pathologist:           Trish Rodriguez  MD [place]                                                                           Specimen:    Large Intestine, Colon, Descending                                                         Final Diagnosis       Large intestine, descending, polypectomy:  -Nonneoplastic colonic mucosa without microscopic abnormalities        Clinical Information       Procedure:  COLONOSCOPY, FLEXIBLE, WITH LESION REMOVAL USING SNARE  Pre-op Diagnosis: Special screening for malignant neoplasm of colon [Z12.11]  Post-op Diagnosis: Z12.11 - Special screening for malignant neoplasm of colon [ICD-10-CM]      Gross Description       A(1). Large Intestine, Colon, Descending, :  The specimen is received in formalin, labeled with the patient's name, medical record number and other identifying information and designated  descending colon . It consists of a single tan soft tissue fragment measuring 0.2 cm. Entirely submitted in one cassette.   (WALTER Clark (ASCP) 8/13/2024 2:29 PM       Microscopic Description       A formal microscopic examination has been performed.  Deeper levels were obtained and evaluated when the initial ones showed no evidence of a neoplastic polyp      Performing Labs       The technical component of this testing was completed at [facility].    Stain controls for all stains resulted within this report have been reviewed and show appropriate reactivity.       Case Images

## 2024-08-13 NOTE — PROGRESS NOTES
[facility] DISCHARGE NOTE    Patient discharged to home at 8:50 AM via ambulation. Accompanied by spouse and staff. Discharge instructions reviewed with patient and spouse, opportunity offered to ask questions. Prescriptions - None ordered for discharge. All belongings sent with patient.    Fina Bowman RN

## 2024-08-15 LAB
PATH REPORT.COMMENTS IMP SPEC: NORMAL
PATH REPORT.COMMENTS IMP SPEC: NORMAL
PATH REPORT.FINAL DX SPEC: NORMAL
PATH REPORT.GROSS SPEC: NORMAL
PATH REPORT.MICROSCOPIC SPEC OTHER STN: NORMAL
PATH REPORT.RELEVANT HX SPEC: NORMAL
PHOTO IMAGE: NORMAL

## 2024-10-16 ENCOUNTER — MYC REFILL (OUTPATIENT)
Dept: FAMILY MEDICINE | Facility: CLINIC | Age: 59
End: 2024-10-16
Payer: COMMERCIAL

## 2024-10-16 DIAGNOSIS — I10 HYPERTENSION GOAL BP (BLOOD PRESSURE) < 140/90: ICD-10-CM

## 2024-10-17 RX ORDER — AMLODIPINE BESYLATE 5 MG/1
5 TABLET ORAL DAILY
Qty: 90 TABLET | Refills: 3 | OUTPATIENT
Start: 2024-10-17

## 2024-10-17 RX ORDER — LOSARTAN POTASSIUM 100 MG/1
100 TABLET ORAL DAILY
Qty: 90 TABLET | Refills: 3 | OUTPATIENT
Start: 2024-10-17

## 2024-10-25 ENCOUNTER — HOSPITAL ENCOUNTER (EMERGENCY)
Facility: CLINIC | Age: 59
Discharge: HOME | End: 2024-10-25
Payer: COMMERCIAL

## 2024-10-25 ENCOUNTER — NURSE TRIAGE (OUTPATIENT)
Dept: FAMILY MEDICINE | Facility: CLINIC | Age: 59
End: 2024-10-25

## 2024-10-25 VITALS
RESPIRATION RATE: 18 BRPM | BODY MASS INDEX: 30.22 KG/M2 | HEART RATE: 68 BPM | HEIGHT: 64 IN | OXYGEN SATURATION: 94 % | SYSTOLIC BLOOD PRESSURE: 135 MMHG | DIASTOLIC BLOOD PRESSURE: 76 MMHG | WEIGHT: 177 LBS | TEMPERATURE: 98.5 F

## 2024-10-25 DIAGNOSIS — R20.2 PARESTHESIAS: Primary | ICD-10-CM

## 2024-10-25 DIAGNOSIS — M79.602 PAIN OF LEFT UPPER EXTREMITY: ICD-10-CM

## 2024-10-25 LAB
ANION GAP SERPL CALCULATED.3IONS-SCNC: 11 MMOL/L (ref 7–15)
BUN SERPL-MCNC: 12.7 MG/DL (ref 8–23)
CALCIUM SERPL-MCNC: 9.4 MG/DL (ref 8.8–10.4)
CHLORIDE SERPL-SCNC: 104 MMOL/L (ref 98–107)
CREAT SERPL-MCNC: 0.65 MG/DL (ref 0.51–0.95)
EGFRCR SERPLBLD CKD-EPI 2021: >90 ML/MIN/1.73M2
ERYTHROCYTE [DISTWIDTH] IN BLOOD BY AUTOMATED COUNT: 12 % (ref 10–15)
GLUCOSE SERPL-MCNC: 113 MG/DL (ref 70–99)
HCO3 SERPL-SCNC: 23 MMOL/L (ref 22–29)
HCT VFR BLD AUTO: 35.8 % (ref 35–47)
HGB BLD-MCNC: 12.8 G/DL (ref 11.7–15.7)
HOLD SPECIMEN: NORMAL
MCH RBC QN AUTO: 31.5 PG (ref 26.5–33)
MCHC RBC AUTO-ENTMCNC: 35.8 G/DL (ref 31.5–36.5)
MCV RBC AUTO: 88 FL (ref 78–100)
PLATELET # BLD AUTO: 214 10E3/UL (ref 150–450)
POTASSIUM SERPL-SCNC: 4.1 MMOL/L (ref 3.4–5.3)
RBC # BLD AUTO: 4.06 10E6/UL (ref 3.8–5.2)
SODIUM SERPL-SCNC: 138 MMOL/L (ref 135–145)
TROPONIN T SERPL HS-MCNC: <6 NG/L
WBC # BLD AUTO: 5 10E3/UL (ref 4–11)

## 2024-10-25 PROCEDURE — 84484 ASSAY OF TROPONIN QUANT: CPT

## 2024-10-25 PROCEDURE — 36415 COLL VENOUS BLD VENIPUNCTURE: CPT

## 2024-10-25 PROCEDURE — 93005 ELECTROCARDIOGRAM TRACING: CPT

## 2024-10-25 PROCEDURE — 82947 ASSAY GLUCOSE BLOOD QUANT: CPT

## 2024-10-25 PROCEDURE — 93010 ELECTROCARDIOGRAM REPORT: CPT

## 2024-10-25 PROCEDURE — 80048 BASIC METABOLIC PNL TOTAL CA: CPT

## 2024-10-25 PROCEDURE — 85027 COMPLETE CBC AUTOMATED: CPT

## 2024-10-25 PROCEDURE — 99284 EMERGENCY DEPT VISIT MOD MDM: CPT

## 2024-10-25 RX ORDER — ALBUTEROL SULFATE 90 UG/1
AEROSOL, METERED RESPIRATORY (INHALATION)
COMMUNITY
Start: 2023-12-14

## 2024-10-25 ASSESSMENT — COLUMBIA-SUICIDE SEVERITY RATING SCALE - C-SSRS
1. IN THE PAST MONTH, HAVE YOU WISHED YOU WERE DEAD OR WISHED YOU COULD GO TO SLEEP AND NOT WAKE UP?: NO
2. HAVE YOU ACTUALLY HAD ANY THOUGHTS OF KILLING YOURSELF IN THE PAST MONTH?: NO
6. HAVE YOU EVER DONE ANYTHING, STARTED TO DO ANYTHING, OR PREPARED TO DO ANYTHING TO END YOUR LIFE?: NO

## 2024-10-25 ASSESSMENT — ACTIVITIES OF DAILY LIVING (ADL)
ADLS_ACUITY_SCORE: 0
ADLS_ACUITY_SCORE: 0

## 2024-10-25 NOTE — TELEPHONE ENCOUNTER
S-(situation): patient calling with left arm numbness & tingling    B-(background): Patient states that she has woken up a few days with left arm numbness and tingling and dizziness    A-(assessment): episode last night that woke pt up from sleep at 4am with left arm numbness/tingling.  Currently still has sensation & this has been happening more frequently.  Sleeps on back so unsure if due to sleep position.  Denies chest pain, difficulty breathing, headache, changes in vision, weakness, or other symptoms.  /88.     R-(recommendations): due to numbness of the left arm that isn't resolving, recommended pt be seen right away.  Appointment scheduled within the hour.   Advised to call back if any new symptoms or worsening symptoms.     Reason for Disposition   Neurologic deficit of gradual onset (e.g., days to weeks), ANY of the following: * Weakness of the face, arm, or leg on one side of the body* Numbness of the face, arm, or leg on one side of the body* Loss of speech or garbled speech   Weakness of the face, arm or leg on one side of the body    Additional Information   Negative: Neurologic deficit that was brief (now gone), ANY of the following: * Weakness of the face, arm, or leg on one side of the body * Numbness of the face, arm, or leg on one side of the body * Loss of speech or garbled speech   Negative: Patient sounds very sick or weak to the triager   Negative: Confusion, disorientation, or hallucinations is main symptom   Negative: Dizziness is main symptom   Negative: Followed a head injury within last 3 days   Negative: Headache (with neurologic deficit)   Negative: Unable to urinate (or only a few drops) and bladder feels very full   Negative: Loss of bladder or bowel control (urine or bowel incontinence; wetting self, leaking stool) of new-onset   Negative: Back pain with numbness (loss of sensation) in groin or rectal area   Negative: Difficult to awaken or acting confused (e.g., disoriented,  "slurred speech)   Negative: New neurologic deficit that is present NOW, sudden onset of ANY of the following: * Weakness of the face, arm, or leg on one side of the body* Numbness of the face, arm, or leg on one side of the body* Loss of speech or garbled speech   Negative: Sounds like a life-threatening emergency to the triager   Negative: SEVERE weakness (i.e., unable to walk or barely able to walk, requires support) and new-onset or worsening    Answer Assessment - Initial Assessment Questions  1. SYMPTOM: \"What is the main symptom you are concerned about?\" (e.g., weakness, numbness)      numbness  2. ONSET: \"When did this start?\" (minutes, hours, days; while sleeping)      Had happened a few times, today is worse  3. LAST NORMAL: \"When was the last time you (the patient) were normal (no symptoms)?\"       4. PATTERN \"Does this come and go, or has it been constant since it started?\"  \"Is it present now?\"      Constant since 4am  5. CARDIAC SYMPTOMS: \"Have you had any of the following symptoms: chest pain, difficulty breathing, palpitations?\"      denies  6. NEUROLOGIC SYMPTOMS: \"Have you had any of the following symptoms: headache, dizziness, vision loss, double vision, changes in speech, unsteady on your feet?\"      denies  7. OTHER SYMPTOMS: \"Do you have any other symptoms?\"      Tingling, dizziness when waking up  8. PREGNANCY: \"Is there any chance you are pregnant?\" \"When was your last menstrual period?\"      no    Protocols used: Weakness (Generalized) and Fatigue-A-OH, Neurologic Deficit-A-OH    "

## 2024-10-25 NOTE — DISCHARGE INSTRUCTIONS
You were seen in the emergency department today for hand numbness/tingling, as well as left arm pain. We did tests including blood test and EKG that showed no clear cause for your symptoms, but was reassuring against a life-threatening cause at this time.     Please follow up with your primary doctor this week for ongoing evaluation and management of your symptoms.    Return to the emergency department with new or worsening symptoms that you find concerning.

## 2024-10-25 NOTE — ED PROVIDER NOTES
"[facility]  Emergency Department Visit Note    PATIENT:  Britt German     59 year old     female      [id number]    Chief complaint:  Chief Complaint   Patient presents with    Numbness          History of present illness:  Patient is a 59 year old female with HTN, HLD, LULÚ presenting for evaluation of hand numbness.    She reports has had intermittent episodes of left greater than right hand numbness intermittently for perhaps several weeks.  She describes her left hand feeling \"tingly\" circumferentially over the entire hand, and also feels like her hand \"falls asleep\".  Occurs almost daily, though did not have it yesterday.  The pain is not always in the morning, not related to exertion or activity and seems to occur at random.    Around 4:00 this morning, she awoke and felt short of breath as well as had pain in her left upper chest/shoulder/upper extremity, as well as having numbness over her left hand as well as some in her right hand.  She took her CPAP off and observe symptoms for some time, the pain mostly resolved and the shortness of breath resolved, though is still having some lingering numbness in her left hand.  Was encouraged to come to the emergency department by her .    Also reports that her left ankle was swollen yesterday, though not today.  No speech or vision changes.  She reports occasional vertigo though no headache, dizziness, chest pain, vomiting, abdominal pain, or history of VTE.      Review of Systems:  As in HPI above    BP (!) 212/125   Pulse 80   Temp 98.5  F (36.9  C) (Oral)   Resp 18   Ht 1.626 m (5' 4\")   Wt 80.3 kg (177 lb)   SpO2 96%   BMI 30.38 kg/m        Physical Exam  Constitutional: laying in hospital bed, alert, oriented, and in no apparent distress  HEENT: normocephalic, atraumatic, pupils 3mm, equal, round, and reactive to light, sclerae anicteric, and extraocular motions intact.  No facial asymmetry.  Neck: able to fully range, no " midline tenderness, and no stridor  Cardiovascular: regular rate and rhythm and no murmurs, rubs, or extra heart sounds  Pulmonary: breathing comfortably on room air and lungs clear to auscultation bilaterally  Abdominal: soft, non-tender, non-distended  Extremities/MSK: no peripheral edema and no reproducible tenderness over the left upper chest, shoulder, or left upper extremity.  Skin: warm, dry  Neurologic: moves all four extremities spontaneously, GCS 15, 5/5  strength bilaterally, 5/5 strength in dorsiflexion and plantarflexion bilaterally, sensation intact to light touch in bilateral upper and lower extremities, no dysmetria on finger-nose-finger, and sensation is intact in median, radial, ulnar nerve distributions bilateral upper extremities.  Psychiatric: calm, appropriate      MDM:  Patient is a 59 year old female with above history presenting for evaluation of intermittent paresthesias and left upper extremity pain that has since resolved.    Vitals notable for hypertension (she did take her antihypertensive this morning), though improved after the initial check in the emergency department into the 160s systolic. Exam is reassuring, she appears well, no focal neurologic deficit.    Unusual presentation of symptoms without clear explanation.  The time course of intermittent symptoms over weeks, bilateral nature this morning, absent focal neurologic deficit now, and distribution isolated to the left hand is not consistent with CVA.  Doubt dissection or intracranial hemorrhage.  Doubt infectious process.  Certainly possible she is experiencing an atypical presentation of unstable angina given left upper extremity/left upper chest pain and dyspnea, though those symptoms have improved as well.    Will focus workup at this point on cardiac etiology.  Lungs are clear and present bilaterally, doubt pulmonary source including PE.  Plan for basic labs, ECG.  Other than concern for potential cardiac pathology,  doubt other source of emergent condition warranting further workup.  We did discuss that given her hypertension and underlying comorbidities she is at long-term risk of CVA and heart attack, though that hypertension would be best managed in the outpatient setting and at this point her symptoms do not represent hypertensive emergency warranting more emergent blood pressure control.    Disposition pending above workup. Remainder of ED course below.    ED COURSE:  ED Course as of 10/25/24 1009   Fri Oct 25, 2024   0936 EKG 12 lead  ECG:  - Ventricular rate 64 bpm, regular  - AL, QRS, QT intervals normal  - Axis borderline left deviated  - no ST segment or T wave changes concerning for acute ischemia  - Comparison to prior ECGs: No significant change  - My independent interpretation: Sinus rhythm, no acute ischemic changes   1000 Labs reviewed, all reassuring for nonfasting lab.  Notably, troponin negative which, in the setting of ECG without acute ischemic changes, greatly lowers concern for ACS contributing.  No anemia.  Electrolytes normal, no kidney injury or other evidence of endorgan dysfunction.    Ultimately, no clear explanation for symptoms though stable for outpatient follow-up.  Recommending primary follow-up this week.       Encounter Diagnoses:  Final diagnoses:   Paresthesias   Pain of left upper extremity       Final disposition: discharge    Jamie Man MD  10/25/2024  9:18 AM   Emergency Medicine  [facility]      Jamie Man MD  10/25/24 9212

## 2024-10-30 ENCOUNTER — IMMUNIZATION (OUTPATIENT)
Dept: FAMILY MEDICINE | Facility: CLINIC | Age: 59
End: 2024-10-30
Payer: COMMERCIAL

## 2024-10-30 PROCEDURE — 91320 SARSCV2 VAC 30MCG TRS-SUC IM: CPT

## 2024-10-30 PROCEDURE — 90471 IMMUNIZATION ADMIN: CPT

## 2024-10-30 PROCEDURE — 90480 ADMN SARSCOV2 VAC 1/ONLY CMP: CPT

## 2024-10-30 PROCEDURE — 90673 RIV3 VACCINE NO PRESERV IM: CPT

## 2024-11-10 ENCOUNTER — HEALTH MAINTENANCE LETTER (OUTPATIENT)
Age: 59
End: 2024-11-10

## 2025-02-17 ENCOUNTER — PATIENT OUTREACH (OUTPATIENT)
Dept: CARE COORDINATION | Facility: CLINIC | Age: 60
End: 2025-02-17
Payer: COMMERCIAL

## 2025-03-13 ENCOUNTER — ANCILLARY PROCEDURE (OUTPATIENT)
Dept: GENERAL RADIOLOGY | Facility: CLINIC | Age: 60
End: 2025-03-13
Attending: FAMILY MEDICINE
Payer: COMMERCIAL

## 2025-03-13 ENCOUNTER — OFFICE VISIT (OUTPATIENT)
Dept: ORTHOPEDICS | Facility: CLINIC | Age: 60
End: 2025-03-13
Payer: COMMERCIAL

## 2025-03-13 DIAGNOSIS — M25.531 ACUTE WRIST PAIN, RIGHT: Primary | ICD-10-CM

## 2025-03-13 DIAGNOSIS — M25.531 ACUTE WRIST PAIN, RIGHT: ICD-10-CM

## 2025-03-13 DIAGNOSIS — M77.8 EXTENSOR CARPI ULNARIS TENDINITIS: ICD-10-CM

## 2025-03-13 NOTE — PROGRESS NOTES
Britt German  :  1965  DOS: 3/13/2025  MRN: [id number]    Sports Medicine Clinic Visit    PCP: Rachel Collins    Britt German is a 59 year old Right hand dominant female who is seen as a self referral presenting with right wrist pain    Injury: Reports insidious onset without acute precipitating event that patient first noticed approximately A couple of month(s). Notes that she had an increase in the last few hours with picking some files up.  Pain located over right wrist/hand, diffuse right wrist to the elbow as well as weakness. Positive: Weakness.  Symptoms are worse with: Lifting, gripping or grasping.  Other evaluation and/or treatments so far consists of: 10/25/24 ED visit for parasthesia of the hands where LABS were performed.  Recent imaging completed: No recent imaging completed.  Prior History of related problems: No hx other than Depuytren's contracture on the left.    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    Past Medical History:   Diagnosis Date    Hyperlipidemia with target LDL less than 160 10/31/2010    Diagnosis updated by automated process. Provider to review and confirm.      Hypertension     Obstructive sleep apnea     Recurrent UTI      Past Surgical History:   Procedure Laterality Date    ABDOMINOPLASTY       . [place]    ARTHROSCOPY ANKLE Left 2016    Procedure: ARTHROSCOPY ANKLE;  Surgeon: Zac Cuello DPM;  Location: [facility]    BIOPSY BREAST      C/SECTION, LOW TRANSVERSE  ,     , Low Transverse    CL AFF SURGICAL PATHOLOGY  ,     Breast reduction    COLONOSCOPY N/A 2024    Procedure: COLONOSCOPY, FLEXIBLE, WITH LESION REMOVAL USING SNARE;  Surgeon: Prakash Álvarez MD;  Location: [facility] GI    D & C       DILATION AND CURETTAGE, HYSTEROSCOPY, ABLATE ENDOMETRIUM NOVASURE, COMBINED  07/02/2014    Procedure: COMBINED DILATION AND CURETTAGE, HYSTEROSCOPY, ABLATE ENDOMETRIUM NOVASURE;  Surgeon: Rachel Hernandez DO;  Location: [facility]    INCISION AND DRAINAGE LOWER EXTREMITY, COMBINED  02/22/2011    COMBINED INCISION AND DRAINAGE LOWER EXTREMITY performed by LEY, JEFFREY DUANE at [facility] OR    MAMMOPLASTY REDUCTION      OPEN REDUCTION INTERNAL FIXATION ANKLE Left 12/23/2015    Procedure: OPEN REDUCTION INTERNAL FIXATION ANKLE;  Surgeon: Zac Cuello DPM;  Location: [facility]    REMOVE HARDWARE ANKLE  02/22/2011    REMOVE HARDWARE ANKLE performed by LEY, JEFFREY DUANE at [facility]    REMOVE HARDWARE ANKLE Left 09/01/2016    Procedure: REMOVE HARDWARE ANKLE;  Surgeon: Zac Cuello DPM;  Location: [facility]    SURGICAL HISTORY OF -   2006    Right ankle fracture, ORIF    TUBAL LIGATION  1994     Family History   Problem Relation Age of Onset    Breast Cancer Mother         dx age 38    Diabetes Sister     Cardiovascular Father         CHF    Respiratory Father         COPD    Cardiovascular Son         aortic stenosis    Diabetes Sister          Objective  There were no vitals taken for this visit.    General: healthy, alert and in no distress    HEENT: no scleral icterus or conjunctival erythema   Skin: no suspicious lesions or rash. No jaundice.   CV: regular rhythm by palpation, 2+ distal pulses, no pedal edema    Resp: normal respiratory effort without conversational dyspnea   Psych: normal mood and affect    Gait: nonantalgic, appropriate coordination and balance   Neuro: normal light touch sensory exam of the extremities. Motor strength as noted below     Right Wrist and Hand exam    Inspection:       Swelling: mild ulnar wrist    Tender:       Most focal over distal ulna over ECU, milder in TFCC region and along distal forearm    Non Tender:       Remainder of the Wrist and Hand right,      distal radius right,       anatomic snuffbox right, and      scapholunate interval right    ROM:       Full and symmetric active and passive range of motion of the forearm, wrist and digits left and      painful terminal flexion and radial deviation of the wrist    Strength:       5/5 strength in the muscles of the hand, wrist and forearm bilateral    Special Tests:        neg (-) Tinel's test bilateral,       neg (-) Phalen's test bilateral, and       neg (-) TFCC compression test right    Neurovascular:       2+ radial pulses bilaterally with brisk capillary refill and      normal sensation to light touch in the radial, median and ulnar nerve distributions      Radiology:  Recent Results (from the past 744 hours)   XR Wrist Right G/E 3 Views    Narrative    EXAM: XR WRIST RIGHT G/E 3 VIEWS  LOCATION: [facility]???  DATE: 3/13/2025    INDICATION: Diffuse right wrist pain  COMPARISON: None.      Impression    IMPRESSION: Mild degenerative change at the radiolunate and radioscaphoid articulations. Benign carpal lucency within the lunate. No evidence for acute fracture. Normal carpal alignment. Mild degenerative change first MCP joint. No erosive change.       Assessment:  1. Acute wrist pain, right    2. Extensor carpi ulnaris tendinitis        Plan:  Discussed the assessment with the patient.  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  Wrist brace provided, use strategies reviewed  Use of short 1-2 wk course of NSAIDs reviewed, dosing and precautions reviewed if utilized  Oral Tylenol and topical Voltaren gel reviewed as safe OTC options, reviewed safe dosing strategies  OT options reviewed, as well as relative rest  Consider advanced imaging vs US guided CSI to the ECU tendon sheath based on progress  XR images independently visualized and reviewed with patient today in clinic  We discussed modified progressive  pain-free activity as tolerated  Home handouts provided and supportive care reviewed  All questions were answered today  Contact us with additional questions or concerns  Signs and sx of concern reviewed      Gamal Bustos DO, FATEMEH  Sports Medicine Physician  [facility]          Disclaimer: This note consists of symbols derived from keyboarding, dictation and/or voice recognition software. As a result, there may be errors in the script that have gone undetected. Please consider this when interpreting information found in this chart.

## 2025-03-13 NOTE — LETTER
3/13/2025      Britt German  [address]  [place] [address]      Dear Colleague,    Thank you for referring your patient, Britt German, to the [facility] [place]. Please see a copy of my visit note below.    Britt German  :  1965  DOS: 3/13/2025  MRN: [id number]    Sports Medicine Clinic Visit    PCP: Rachel Collins    Britt German is a 59 year old Right hand dominant female who is seen as a self referral presenting with right wrist pain    Injury: Reports insidious onset without acute precipitating event that patient first noticed approximately A couple of month(s). Notes that she had an increase in the last few hours with picking some files up.  Pain located over right wrist/hand, diffuse right wrist to the elbow as well as weakness. Positive: Weakness.  Symptoms are worse with: Lifting, gripping or grasping.  Other evaluation and/or treatments so far consists of: 10/25/24 ED visit for parasthesia of the hands where LABS were performed.  Recent imaging completed: No recent imaging completed.  Prior History of related problems: No hx other than Depuytren's contracture on the left.    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    Past Medical History:   Diagnosis Date     Hyperlipidemia with target LDL less than 160 10/31/2010    Diagnosis updated by automated process. Provider to review and confirm.       Hypertension      Obstructive sleep apnea      Recurrent UTI      Past Surgical History:   Procedure Laterality Date     ABDOMINOPLASTY       . [place]     ARTHROSCOPY ANKLE Left 2016    Procedure: ARTHROSCOPY ANKLE;  Surgeon: Zac Cuello DPM;  Location: [facility]     BIOPSY BREAST       C/SECTION, LOW TRANSVERSE   ,     , Low Transverse     CL AFF SURGICAL PATHOLOGY  ,     Breast reduction     COLONOSCOPY N/A 2024    Procedure: COLONOSCOPY, FLEXIBLE, WITH LESION REMOVAL USING SNARE;  Surgeon: Prakash Álvarez MD;  Location: [facility]     D & C       DILATION AND CURETTAGE, HYSTEROSCOPY, ABLATE ENDOMETRIUM NOVASURE, COMBINED  2014    Procedure: COMBINED DILATION AND CURETTAGE, HYSTEROSCOPY, ABLATE ENDOMETRIUM NOVASURE;  Surgeon: Rachel Hernandez DO;  Location: [facility]     INCISION AND DRAINAGE LOWER EXTREMITY, COMBINED  2011    COMBINED INCISION AND DRAINAGE LOWER EXTREMITY performed by LEY, JEFFREY DUANE at [facility]     MAMMOPLASTY REDUCTION       OPEN REDUCTION INTERNAL FIXATION ANKLE Left 2015    Procedure: OPEN REDUCTION INTERNAL FIXATION ANKLE;  Surgeon: Zac Cuello DPM;  Location: [facility]     REMOVE HARDWARE ANKLE  2011    REMOVE HARDWARE ANKLE performed by LEY, JEFFREY DUANE at [facility]     REMOVE HARDWARE ANKLE Left 2016    Procedure: REMOVE HARDWARE ANKLE;  Surgeon: Zac Cuello DPM;  Location: [facility]     SURGICAL HISTORY OF -       Right ankle fracture, ORIF     TUBAL LIGATION       Family History   Problem Relation Age of Onset     Breast Cancer Mother         dx age 38     Diabetes Sister      Cardiovascular Father         CHF     Respiratory Father         COPD     Cardiovascular Son         aortic stenosis     Diabetes Sister          Objective  There were no vitals taken for this visit.    General: healthy, alert and in no distress    HEENT: no scleral icterus or conjunctival erythema   Skin: no suspicious lesions or rash. No jaundice.   CV: regular rhythm by palpation, 2+ distal pulses, no pedal edema    Resp: normal respiratory effort without conversational dyspnea   Psych: normal mood and affect    Gait: nonantalgic, appropriate coordination and balance   Neuro: normal light touch sensory exam of the extremities. Motor strength  as noted below     Right Wrist and Hand exam    Inspection:       Swelling: mild ulnar wrist    Tender:       Most focal over distal ulna over ECU, milder in TFCC region and along distal forearm    Non Tender:       Remainder of the Wrist and Hand right,      distal radius right,      anatomic snuffbox right, and      scapholunate interval right    ROM:       Full and symmetric active and passive range of motion of the forearm, wrist and digits left and      painful terminal flexion and radial deviation of the wrist    Strength:       5/5 strength in the muscles of the hand, wrist and forearm bilateral    Special Tests:        neg (-) Tinel's test bilateral,       neg (-) Phalen's test bilateral, and       neg (-) TFCC compression test right    Neurovascular:       2+ radial pulses bilaterally with brisk capillary refill and      normal sensation to light touch in the radial, median and ulnar nerve distributions      Radiology:  Recent Results (from the past 744 hours)   XR Wrist Right G/E 3 Views    Narrative    EXAM: XR WRIST RIGHT G/E 3 VIEWS  LOCATION: [facility] ORTHOPED[facility]???  DATE: 3/13/2025    INDICATION: Diffuse right wrist pain  COMPARISON: None.      Impression    IMPRESSION: Mild degenerative change at the radiolunate and radioscaphoid articulations. Benign carpal lucency within the lunate. No evidence for acute fracture. Normal carpal alignment. Mild degenerative change first MCP joint. No erosive change.       Assessment:  1. Acute wrist pain, right    2. Extensor carpi ulnaris tendinitis        Plan:  Discussed the assessment with the patient.  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  Wrist brace provided, use strategies reviewed  Use of short 1-2 wk course of NSAIDs reviewed, dosing and precautions reviewed if utilized  Oral Tylenol and topical Voltaren gel reviewed as safe OTC options,  reviewed safe dosing strategies  OT options reviewed, as well as relative rest  Consider advanced imaging vs US guided CSI to the ECU tendon sheath based on progress  XR images independently visualized and reviewed with patient today in clinic  We discussed modified progressive pain-free activity as tolerated  Home handouts provided and supportive care reviewed  All questions were answered today  Contact us with additional questions or concerns  Signs and sx of concern reviewed      Gamal Bustos DO, FATEMEH  Sports Medicine Physician  [facility]          Disclaimer: This note consists of symbols derived from keyboarding, dictation and/or voice recognition software. As a result, there may be errors in the script that have gone undetected. Please consider this when interpreting information found in this chart.      Again, thank you for allowing me to participate in the care of your patient.        Sincerely,        Gamal Bustos DO    Electronically signed

## 2025-03-25 ENCOUNTER — HOSPITAL ENCOUNTER (OUTPATIENT)
Dept: MAMMOGRAPHY | Facility: CLINIC | Age: 60
Discharge: HOME | End: 2025-03-25
Attending: NURSE PRACTITIONER | Admitting: NURSE PRACTITIONER
Payer: COMMERCIAL

## 2025-03-25 DIAGNOSIS — Z12.31 VISIT FOR SCREENING MAMMOGRAM: ICD-10-CM

## 2025-03-25 PROCEDURE — 77063 BREAST TOMOSYNTHESIS BI: CPT

## 2025-03-25 PROCEDURE — 77067 SCR MAMMO BI INCL CAD: CPT

## 2025-04-07 ENCOUNTER — PATIENT OUTREACH (OUTPATIENT)
Dept: CARE COORDINATION | Facility: CLINIC | Age: 60
End: 2025-04-07
Payer: COMMERCIAL

## 2025-04-21 ENCOUNTER — PATIENT OUTREACH (OUTPATIENT)
Dept: CARE COORDINATION | Facility: CLINIC | Age: 60
End: 2025-04-21
Payer: COMMERCIAL

## 2025-06-21 ENCOUNTER — HEALTH MAINTENANCE LETTER (OUTPATIENT)
Age: 60
End: 2025-06-21

## 2025-06-24 DIAGNOSIS — I10 HYPERTENSION GOAL BP (BLOOD PRESSURE) < 140/90: ICD-10-CM

## 2025-06-24 RX ORDER — AMLODIPINE BESYLATE 5 MG/1
5 TABLET ORAL DAILY
Qty: 90 TABLET | Refills: 0 | Status: SHIPPED | OUTPATIENT
Start: 2025-06-24

## 2025-06-24 RX ORDER — LOSARTAN POTASSIUM 100 MG/1
100 TABLET ORAL DAILY
Qty: 90 TABLET | Refills: 0 | Status: SHIPPED | OUTPATIENT
Start: 2025-06-24

## (undated) DEVICE — ENDO SNARE EXACTO COLD 9MM LOOP 2.4MMX230CM 00711115

## (undated) RX ORDER — LIDOCAINE HYDROCHLORIDE 10 MG/ML
INJECTION, SOLUTION EPIDURAL; INFILTRATION; INTRACAUDAL; PERINEURAL
Status: DISPENSED
Start: 2024-08-13